# Patient Record
Sex: FEMALE | Race: WHITE | Employment: FULL TIME | URBAN - METROPOLITAN AREA
[De-identification: names, ages, dates, MRNs, and addresses within clinical notes are randomized per-mention and may not be internally consistent; named-entity substitution may affect disease eponyms.]

---

## 2017-01-02 ENCOUNTER — TELEPHONE (OUTPATIENT)
Dept: FAMILY MEDICINE | Facility: CLINIC | Age: 27
End: 2017-01-02

## 2017-01-11 ENCOUNTER — OFFICE VISIT (OUTPATIENT)
Dept: URGENT CARE | Facility: URGENT CARE | Age: 27
End: 2017-01-11
Payer: COMMERCIAL

## 2017-01-11 VITALS — HEART RATE: 118 BPM | RESPIRATION RATE: 16 BRPM | TEMPERATURE: 100 F | OXYGEN SATURATION: 97 %

## 2017-01-11 DIAGNOSIS — R06.02 SOB (SHORTNESS OF BREATH): ICD-10-CM

## 2017-01-11 DIAGNOSIS — R50.9 FEVER, UNSPECIFIED: ICD-10-CM

## 2017-01-11 DIAGNOSIS — J10.1 INFLUENZA A: Primary | ICD-10-CM

## 2017-01-11 LAB
FLUAV+FLUBV AG SPEC QL: ABNORMAL
FLUAV+FLUBV AG SPEC QL: ABNORMAL
SPECIMEN SOURCE: ABNORMAL

## 2017-01-11 PROCEDURE — 87804 INFLUENZA ASSAY W/OPTIC: CPT | Performed by: FAMILY MEDICINE

## 2017-01-11 PROCEDURE — 99213 OFFICE O/P EST LOW 20 MIN: CPT | Performed by: FAMILY MEDICINE

## 2017-01-11 RX ORDER — OSELTAMIVIR PHOSPHATE 75 MG/1
75 CAPSULE ORAL 2 TIMES DAILY
Qty: 10 CAPSULE | Refills: 0 | Status: SHIPPED | OUTPATIENT
Start: 2017-01-11 | End: 2017-04-19

## 2017-01-11 RX ORDER — ALBUTEROL SULFATE 90 UG/1
2 AEROSOL, METERED RESPIRATORY (INHALATION) EVERY 6 HOURS PRN
Qty: 1 INHALER | Refills: 0 | Status: SHIPPED | OUTPATIENT
Start: 2017-01-11

## 2017-01-11 NOTE — MR AVS SNAPSHOT
After Visit Summary   1/11/2017    Anny Aragon    MRN: [id number]           Patient Information     Date Of Birth          1990        Visit Information        Provider Department      1/11/2017 8:00 PM Baljeet Spence MD [facility]        Today's Diagnoses     Influenza A    -  1     SOB (shortness of breath)         Fever, unspecified           Care Instructions    Take full course of tamiflu for influenza A.  Use albuterol inhaler every 4-6 hours as needed.      Influenza (Adult)    Influenza is also called the flu. It is a viral illness that affects the air passages of your lungs. It is different from the common cold. The flu can easily be passed from one to person to another. It may be spread through the air by coughing and sneezing. Or it can be spread by touching the sick person and then touching your own eyes, nose, or mouth.  The flu starts 1 to 3 days after you are exposed to the flu virus. It may last for 1 to 2 weeks. You usually don t need to take antibiotics unless you have a complication. This might be an ear or sinus infection or pneumonia.  Symptoms of the flu may be mild or severe. They can include extreme tiredness (wanting to stay in bed all day), chills, fevers, muscle aches, soreness with eye movement, headache, and a dry, hacking cough.  Home care  Follow these guidelines when caring for yourself at home:    Avoid being around cigarette smoke, whether yours or other people s.    Acetaminophen or ibuprofen will help ease your fever, muscle aches, and headache. Don t give aspirin to anyone younger than 18 who has the flu. Aspirin can harm the liver.    Nausea and loss of appetite are common with the flu. Eat light meals. Drink 6 to 8 glasses of liquids every day. Good choices are water, sport drinks, soft drinks without caffeine, juices, tea, and soup. Extra fluids will also help loosen secretions in your nose and lungs.    Over-the-counter cold medicines  will not make the flu go away faster. But the medicines may help with coughing, sore throat, and congestion in your nose and sinuses. Don t use a decongestant if you have high blood pressure.    Stay home until your fever has been gone for at least 24 hours without using medicine to reduce fever.  Follow-up care  Follow up with your health care provider, or as advised, if you are not getting better over the next week.  If you are 65 or older, talk with your provider about getting a pneumococcal vaccine every 5 years. You should also get this vaccine if you have chronic asthma or COPD. All adults should get a flu vaccine every fall. Ask your provider about this.  When to seek medical advice  Call your health care provider right away if any of these occur:    Cough with lots of colored sputum (mucus) or blood in your sputum    Chest pain, shortness of breath, wheezing, or difficulty breathing    Severe headache, or face, neck, or ear pain    New rash with fever    Fever of 101 F (38 C) oral or higher that doesn t get better with fever medicine    Confusion, behavior change, or seizure    Severe weakness or dizziness    You get a fever or cough after getting better for a few days       0972-0999 [employer]. [address] [place] [address]. All rights reserved. This information is not intended as a substitute for professional medical care. Always follow your healthcare professional's instructions.              Follow-ups after your visit        Who to contact     If you have questions or need follow up information about today's clinic visit or your schedule please contact [facility] directly at [phone].  Normal or non-critical lab and imaging results will be communicated to you by MyChart, letter or phone within 4 business days after the clinic has received the results. If you do not hear from us within 7 days, please contact the clinic through MyChart or phone. If you have  "a critical or abnormal lab result, we will notify you by phone as soon as possible.  Submit refill requests through [employer] or call your pharmacy and they will forward the refill request to us. Please allow 3 business days for your refill to be completed.          Additional Information About Your Visit        [employer] Information     [employer] lets you send messages to your doctor, view your test results, renew your prescriptions, schedule appointments and more. To sign up, go to [email or web address] . Click on \"Log in\" on the left side of the screen, which will take you to the Welcome page. Then click on \"Sign up Now\" on the right side of the page.     You will be asked to enter the access code listed below, as well as some personal information. Please follow the directions to create your username and password.     Your access code is: [email or web address]  Expires: 2017  8:35 PM     Your access code will  in 90 days. If you need help or a new code, please call your [facility] clinic or [phone].        Care EveryWhere ID     This is your Care EveryWhere ID. This could be used by other organizations to access your [facility] medical records  [id number]        Your Vitals Were     Pulse Temperature Respirations Pulse Oximetry          118 100  F (37.8  C) (Oral) 16 97%         Blood Pressure from Last 3 Encounters:   08/26/15 136/88   06/24/15 134/82   02/10/15 118/70    Weight from Last 3 Encounters:   08/26/15 290 lb (131.543 kg)   06/24/15 295 lb (133.811 kg)   02/10/15 285 lb (129.275 kg)              We Performed the Following     Influenza A/B antigen          Today's Medication Changes          These changes are accurate as of: 17  8:35 PM.  If you have any questions, ask your nurse or doctor.               Start taking these medicines.        Dose/Directions    oseltamivir 75 MG capsule   Commonly known as:  TAMIFLU   Used for:  Influenza A   Started by:  Baljeet Spence MD        Dose:  75 mg   Take " 1 capsule (75 mg) by mouth 2 times daily   Quantity:  10 capsule   Refills:  0         These medicines have changed or have updated prescriptions.        Dose/Directions    * albuterol 108 (90 BASE) MCG/ACT Inhaler   Commonly known as:  PROAIR HFA/PROVENTIL HFA/VENTOLIN HFA   This may have changed:  Another medication with the same name was added. Make sure you understand how and when to take each.   Used for:  Exercise-induced asthma   Changed by:  Kirsten Bolivar PA-C        Dose:  2 puff   Inhale 2 puffs into the lungs every 6 hours as needed for shortness of breath / dyspnea   Quantity:  1 Inhaler   Refills:  0       * albuterol 108 (90 BASE) MCG/ACT Inhaler   Commonly known as:  PROAIR HFA/PROVENTIL HFA/VENTOLIN HFA   This may have changed:  You were already taking a medication with the same name, and this prescription was added. Make sure you understand how and when to take each.   Used for:  SOB (shortness of breath)   Changed by:  Baljeet Spence MD        Dose:  2 puff   Inhale 2 puffs into the lungs every 6 hours as needed for shortness of breath / dyspnea or wheezing   Quantity:  1 Inhaler   Refills:  0       * Notice:  This list has 2 medication(s) that are the same as other medications prescribed for you. Read the directions carefully, and ask your doctor or other care provider to review them with you.         Where to get your medicines      [facility] [address] [place] [address] [address] [address]    Hours:  24-hours Phone:  [phone]    - albuterol 108 (90 BASE) MCG/ACT Inhaler  - oseltamivir 75 MG capsule             Primary Care Provider Office Phone # Fax #    Kirsten Bolivar PA-C [phone]       [address] [address]        Thank you!     Thank you for choosing [facility]  for your care. Our goal is always to  provide you with excellent care. Hearing back from our patients is one way we can continue to improve our services. Please take a few minutes to complete the written survey that you may receive in the mail after your visit with us. Thank you!             Your Updated Medication List - Protect others around you: Learn how to safely use, store and throw away your medicines at www.disposemymeds.org.          This list is accurate as of: 1/11/17  8:35 PM.  Always use your most recent med list.                   Brand Name Dispense Instructions for use    * albuterol 108 (90 BASE) MCG/ACT Inhaler    PROAIR HFA/PROVENTIL HFA/VENTOLIN HFA    1 Inhaler    Inhale 2 puffs into the lungs every 6 hours as needed for shortness of breath / dyspnea       * albuterol 108 (90 BASE) MCG/ACT Inhaler    PROAIR HFA/PROVENTIL HFA/VENTOLIN HFA    1 Inhaler    Inhale 2 puffs into the lungs every 6 hours as needed for shortness of breath / dyspnea or wheezing       oseltamivir 75 MG capsule    TAMIFLU    10 capsule    Take 1 capsule (75 mg) by mouth 2 times daily       * Notice:  This list has 2 medication(s) that are the same as other medications prescribed for you. Read the directions carefully, and ask your doctor or other care provider to review them with you.

## 2017-01-11 NOTE — Clinical Note
[facility]  [address]  [address]  [facility] [place] [address]  [phone]  Dept: [phone]      1/11/2017    Re: Anny Aragon      TO WHOM IT MAY CONCERN:    Anny Aragon  was seen on 1/11/2017.  Please excuse her from work 1/11-1/12 due to influenza A.      Charbel Spence MD  [facility]

## 2017-01-12 NOTE — NURSING NOTE
"Chief Complaint   Patient presents with     Urgent Care     Shortness of Breath     Fever with SOB X 2 days.     Initial Pulse 118  Temp(Src) 100  F (37.8  C) (Oral)  Resp 16  SpO2 97% Estimated body mass index is 49.75 kg/(m^2) as calculated from the following:    Height as of 8/26/15: 5' 4\" (1.626 m).    Weight as of 8/26/15: 290 lb (131.543 kg).  BP completed using cuff size  NA (Not Taken)    Ada Miller/GOPAL    "

## 2017-01-12 NOTE — PATIENT INSTRUCTIONS
Take full course of tamiflu for influenza A.  Use albuterol inhaler every 4-6 hours as needed.      Influenza (Adult)    Influenza is also called the flu. It is a viral illness that affects the air passages of your lungs. It is different from the common cold. The flu can easily be passed from one to person to another. It may be spread through the air by coughing and sneezing. Or it can be spread by touching the sick person and then touching your own eyes, nose, or mouth.  The flu starts 1 to 3 days after you are exposed to the flu virus. It may last for 1 to 2 weeks. You usually don t need to take antibiotics unless you have a complication. This might be an ear or sinus infection or pneumonia.  Symptoms of the flu may be mild or severe. They can include extreme tiredness (wanting to stay in bed all day), chills, fevers, muscle aches, soreness with eye movement, headache, and a dry, hacking cough.  Home care  Follow these guidelines when caring for yourself at home:    Avoid being around cigarette smoke, whether yours or other people s.    Acetaminophen or ibuprofen will help ease your fever, muscle aches, and headache. Don t give aspirin to anyone younger than 18 who has the flu. Aspirin can harm the liver.    Nausea and loss of appetite are common with the flu. Eat light meals. Drink 6 to 8 glasses of liquids every day. Good choices are water, sport drinks, soft drinks without caffeine, juices, tea, and soup. Extra fluids will also help loosen secretions in your nose and lungs.    Over-the-counter cold medicines will not make the flu go away faster. But the medicines may help with coughing, sore throat, and congestion in your nose and sinuses. Don t use a decongestant if you have high blood pressure.    Stay home until your fever has been gone for at least 24 hours without using medicine to reduce fever.  Follow-up care  Follow up with your health care provider, or as advised, if you are not getting better over the  next week.  If you are 65 or older, talk with your provider about getting a pneumococcal vaccine every 5 years. You should also get this vaccine if you have chronic asthma or COPD. All adults should get a flu vaccine every fall. Ask your provider about this.  When to seek medical advice  Call your health care provider right away if any of these occur:    Cough with lots of colored sputum (mucus) or blood in your sputum    Chest pain, shortness of breath, wheezing, or difficulty breathing    Severe headache, or face, neck, or ear pain    New rash with fever    Fever of 101 F (38 C) oral or higher that doesn t get better with fever medicine    Confusion, behavior change, or seizure    Severe weakness or dizziness    You get a fever or cough after getting better for a few days       9457-0955 [employer]. [address] [place] [address], [place] [address]. All rights reserved. This information is not intended as a substitute for professional medical care. Always follow your healthcare professional's instructions.

## 2017-01-12 NOTE — PROGRESS NOTES
SUBJECTIVE:   Anny Aragon is a 26 year old female presenting with a chief complaint of SOB.  Initially had chills and fever, developed SOB today.  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  Generalized bodyaches  Onset of symptoms was 1 day(s) ago.  Course of illness is worsening.    Severity moderate  Current and Associated symptoms: cough and SOB  Treatment measures tried include Fluids, OTC meds - mucinex, Vicks and Rest.  Predisposing factors include : exercise induce asthma.    Past Medical History   Diagnosis Date     PCOS (polycystic ovarian syndrome)      Obesity      Current Outpatient Prescriptions   Medication Sig Dispense Refill     albuterol (PROAIR HFA, PROVENTIL HFA, VENTOLIN HFA) 108 (90 BASE) MCG/ACT inhaler Inhale 2 puffs into the lungs every 6 hours as needed for shortness of breath / dyspnea 1 Inhaler 0     Social History   Substance Use Topics     Smoking status: Never Smoker      Smokeless tobacco: Never Used     Alcohol Use: No       ROS:  CONSTITUTIONAL:POSITIVE  for fatigue, fever, malaise and myalgias  INTEGUMENTARY/SKIN: NEGATIVE for worrisome rashes, moles or lesions  ENT/MOUTH: NEGATIVE for ear, mouth and throat problems and POSITIVE for rhinorrhea-clear  RESP:POSITIVE for SOB/dyspnea  CV: NEGATIVE for chest pain, palpitations or peripheral edema  GI: NEGATIVE for nausea, abdominal pain, heartburn, or change in bowel habits    OBJECTIVE  :Pulse 118  Temp(Src) 100  F (37.8  C) (Oral)  Resp 16  SpO2 97%  GENERAL APPEARANCE: healthy, alert and no distress  EYES: EOMI,  PERRL, conjunctiva clear  HENT: ear canals and TM's normal.  Nose and mouth without ulcers, erythema or lesions  NECK: supple, nontender, no lymphadenopathy  RESP: lungs clear to auscultation - no rales, rhonchi or wheezes  CV: regular rates and rhythm, normal S1 S2, no murmur noted  NEURO: Normal strength and tone, sensory exam grossly normal,  normal speech and mentation  SKIN: no suspicious lesions or  rashes    Results for orders placed or performed in visit on 01/11/17   Influenza A/B antigen   Result Value Ref Range    Influenza A/B Agn Specimen Nasal     Influenza A (A) NEG     Positive   Test results must be correlated with clinical data. If necessary, results   should be confirmed by a molecular assay or viral culture.      Influenza B  NEG     Negative   Test results must be correlated with clinical data. If necessary, results   should be confirmed by a molecular assay or viral culture.         ASSESSMENT/PLAN:  (J10.1) Influenza A  (primary encounter diagnosis)  Plan: oseltamivir (TAMIFLU) 75 MG capsule            (R06.02) SOB (shortness of breath)  Comment: influenza A  Plan: Influenza A/B antigen, albuterol (PROAIR         HFA/PROVENTIL HFA/VENTOLIN HFA) 108 (90 BASE)         MCG/ACT Inhaler            (R50.9) Fever, unspecified  Comment: influenza A  Plan: Influenza A/B antigen            Reassurance given, reviewed symptomatic treatment, plenty of fluids and rest.  RX for Tamiflu, appropriate as symptoms within 48 hours window.  RX for albuterol inhaler given to help with SOB.    Work excuse note given to be off work.  Return to clinic if no resolution of symptoms.    Baljeet Spence MD  January 11, 2017 8:40 PM

## 2017-01-19 NOTE — TELEPHONE ENCOUNTER
Call Regarding Preventive Health Screening Cervical/PAP    Attempt 2    Message     Comments: Per patient will call back when ready      Outreach   Caroline Alejandra

## 2017-04-19 ENCOUNTER — OFFICE VISIT (OUTPATIENT)
Dept: URGENT CARE | Facility: URGENT CARE | Age: 27
End: 2017-04-19
Payer: COMMERCIAL

## 2017-04-19 VITALS
BODY MASS INDEX: 51.32 KG/M2 | TEMPERATURE: 98.6 F | OXYGEN SATURATION: 97 % | SYSTOLIC BLOOD PRESSURE: 164 MMHG | DIASTOLIC BLOOD PRESSURE: 94 MMHG | WEIGHT: 293 LBS | HEART RATE: 108 BPM

## 2017-04-19 DIAGNOSIS — R10.9 FLANK PAIN: Primary | ICD-10-CM

## 2017-04-19 LAB
ALBUMIN UR-MCNC: NEGATIVE MG/DL
APPEARANCE UR: CLEAR
BACTERIA #/AREA URNS HPF: ABNORMAL /HPF
BILIRUB UR QL STRIP: NEGATIVE
COLOR UR AUTO: YELLOW
GLUCOSE UR STRIP-MCNC: NEGATIVE MG/DL
HGB UR QL STRIP: ABNORMAL
KETONES UR STRIP-MCNC: NEGATIVE MG/DL
LEUKOCYTE ESTERASE UR QL STRIP: NEGATIVE
NITRATE UR QL: NEGATIVE
NON-SQ EPI CELLS #/AREA URNS LPF: ABNORMAL /LPF
PH UR STRIP: 5 PH (ref 5–7)
RBC #/AREA URNS AUTO: ABNORMAL /HPF (ref 0–2)
SP GR UR STRIP: 1.02 (ref 1–1.03)
URN SPEC COLLECT METH UR: ABNORMAL
UROBILINOGEN UR STRIP-ACNC: 0.2 EU/DL (ref 0.2–1)
WBC #/AREA URNS AUTO: ABNORMAL /HPF (ref 0–2)

## 2017-04-19 PROCEDURE — 81001 URINALYSIS AUTO W/SCOPE: CPT | Performed by: FAMILY MEDICINE

## 2017-04-19 PROCEDURE — 99212 OFFICE O/P EST SF 10 MIN: CPT | Performed by: INTERNAL MEDICINE

## 2017-04-19 NOTE — NURSING NOTE
"Chief Complaint   Patient presents with     Urgent Care     Flank Pain     Right flank pain X 4 days.  No urinary symptoms.     Initial BP (!) 164/94  Pulse 108  Temp 98.6  F (37  C) (Tympanic)  Wt 299 lb (135.6 kg)  SpO2 97%  BMI 51.32 kg/m2 Estimated body mass index is 51.32 kg/(m^2) as calculated from the following:    Height as of 8/26/15: 5' 4\" (1.626 m).    Weight as of this encounter: 299 lb (135.6 kg).  BP completed using cuff size  large    Ada Miller/GOPAL    "

## 2017-04-22 NOTE — PROGRESS NOTES
[facility] Progress Note        Jossie Mcneal MD, MPH  4-        History:      A pleasant 26 year old year old female is seen for right flank pain x 4 days. She feels her pain is exacerbated today. No radiation of the pain to groin is referred. No nausea or vomiting is referred. No hematuria or dysuria or pyuria is referred. No fever or chills. Patient;s states that her mesnes is irregular. No vaginal discharge. No cough or dyspnea is referred. No melena or hematochezia. No headache or neck pain.         Assessment and Plan:        Right Flank pain    - *UA reflex to Microscopic and Culture ([place] and [facility] (except [place] and [place])  - Urine Microscopic: 0-2 RBC  and 0-2 WBC  Long discussion w the patient regarding further evaluation and going to ER for undertaking imaging of abdomen to rule out etiologies such as nephrogenic causes as well as GI and pelvic pathology. The patient declined despite explanation of the risks involved.  Tylenol 650 mg po q 6 hours alternating w Ibuprofen 400 mg po q 6 hours prn.  Elevated BP: no chest pain or visual symptoms. No headache. Advised to be reevaluated by PCP in 1 week to recheck BP.  F/U w PCP ASAP as the patient declines further evaluation currently in th e ER.                     Physical Exam:      BP (!) 164/94  Pulse 108  Temp 98.6  F (37  C) (Tympanic)  Wt 299 lb (135.6 kg)  SpO2 97%  BMI 51.32 kg/m2     Constitutional: Patient is in no distress The patient is pleasant and cooperative.   HEENT: Head:  Head is atraumatic, normocephalic.    Eyes: Pupils are equal, round and reactive to light and accomodation.  Sclera is non-icteric. No conjunctival injection, or exudate noted. Extraocular motion is intact. Visual acuity is intact bilaterally.  Ears:  External acoustic canals are patent and clear.  There is no erythema and bulging( exudate)  of the ( R/L ) tympanic membrane(s ).   Nose:  No Nasal congestion w/o drainage or  mucosal ulceration is noted.  Throat:  Oral mucosa is moist.  No oral lesions are noted.  No posterior pharyngeal hyperemia or exudate noted.     Neck Supple.  There is no cervical lymphadenopathy.  No nuchal rigidity noted.  There is no meningismus.     Cardiovascular: Heart is regular to rate and rhythm.  No murmur is noted.     Chest. Chest Symmetrical, no soft tissues, swelling, or tenderness upon palpation   Lungs: Clear in the anterior and posterior pulmonary fields.   Abdomen: Soft and non-tender. Positive bowel sounds.   Back Right flank tenderness is noted. No point tenderness of the spine is noted.   Extremeties No edema, no calf tenderness.   Neuro: No focal deficit.   Skin No petechiae or purpura is noted.  There is no rash.   Mood Normal              Medications:        PRN Meds:          Data:      All new lab and imaging data was reviewed.   Results for orders placed or performed in visit on 04/19/17   *UA reflex to Microscopic and Culture ([place] and [facility] (except [place] and [place])   Result Value Ref Range    Color Urine Yellow     Appearance Urine Clear     Glucose Urine Negative NEG mg/dL    Bilirubin Urine Negative NEG    Ketones Urine Negative NEG mg/dL    Specific Gravity Urine 1.020 1.003 - 1.035    Blood Urine Moderate (A) NEG    pH Urine 5.0 5.0 - 7.0 pH    Protein Albumin Urine Negative NEG mg/dL    Urobilinogen Urine 0.2 0.2 - 1.0 EU/dL    Nitrite Urine Negative NEG    Leukocyte Esterase Urine Negative NEG    Source Midstream Urine    Urine Microscopic   Result Value Ref Range    WBC Urine O - 2 0 - 2 /HPF    RBC Urine O - 2 0 - 2 /HPF    Squamous Epithelial /LPF Urine Few FEW /LPF    Bacteria Urine Few (A) NEG /HPF

## 2017-09-03 ENCOUNTER — HEALTH MAINTENANCE LETTER (OUTPATIENT)
Age: 27
End: 2017-09-03

## 2017-12-08 ENCOUNTER — OFFICE VISIT (OUTPATIENT)
Dept: FAMILY MEDICINE | Facility: CLINIC | Age: 27
End: 2017-12-08
Payer: COMMERCIAL

## 2017-12-08 VITALS
WEIGHT: 291 LBS | OXYGEN SATURATION: 96 % | TEMPERATURE: 98.6 F | DIASTOLIC BLOOD PRESSURE: 130 MMHG | SYSTOLIC BLOOD PRESSURE: 170 MMHG | HEART RATE: 97 BPM | HEIGHT: 64 IN | BODY MASS INDEX: 49.68 KG/M2

## 2017-12-08 DIAGNOSIS — I10 BENIGN ESSENTIAL HYPERTENSION: ICD-10-CM

## 2017-12-08 DIAGNOSIS — Z01.84 IMMUNITY STATUS TESTING: ICD-10-CM

## 2017-12-08 DIAGNOSIS — E28.2 PCOS (POLYCYSTIC OVARIAN SYNDROME): ICD-10-CM

## 2017-12-08 DIAGNOSIS — Z23 ENCOUNTER FOR IMMUNIZATION: ICD-10-CM

## 2017-12-08 DIAGNOSIS — Z00.00 ROUTINE GENERAL MEDICAL EXAMINATION AT A HEALTH CARE FACILITY: Primary | ICD-10-CM

## 2017-12-08 DIAGNOSIS — E66.01 MORBID OBESITY (H): ICD-10-CM

## 2017-12-08 LAB
BETA HCG QUAL IFA URINE: NEGATIVE
ERYTHROCYTE [DISTWIDTH] IN BLOOD BY AUTOMATED COUNT: 14.3 % (ref 10–15)
HBA1C MFR BLD: 5.4 % (ref 4.3–6)
HCT VFR BLD AUTO: 45.5 % (ref 35–47)
HGB BLD-MCNC: 15.5 G/DL (ref 11.7–15.7)
MCH RBC QN AUTO: 27 PG (ref 26.5–33)
MCHC RBC AUTO-ENTMCNC: 34.1 G/DL (ref 31.5–36.5)
MCV RBC AUTO: 79 FL (ref 78–100)
PLATELET # BLD AUTO: ABNORMAL 10E9/L (ref 150–450)
RBC # BLD AUTO: 5.74 10E12/L (ref 3.8–5.2)
WBC # BLD AUTO: 11.1 10E9/L (ref 4–11)

## 2017-12-08 PROCEDURE — 99395 PREV VISIT EST AGE 18-39: CPT | Performed by: PHYSICIAN ASSISTANT

## 2017-12-08 PROCEDURE — 84703 CHORIONIC GONADOTROPIN ASSAY: CPT | Performed by: PHYSICIAN ASSISTANT

## 2017-12-08 PROCEDURE — 83036 HEMOGLOBIN GLYCOSYLATED A1C: CPT | Performed by: PHYSICIAN ASSISTANT

## 2017-12-08 PROCEDURE — 90746 HEPB VACCINE 3 DOSE ADULT IM: CPT | Performed by: PHYSICIAN ASSISTANT

## 2017-12-08 PROCEDURE — 84443 ASSAY THYROID STIM HORMONE: CPT | Performed by: PHYSICIAN ASSISTANT

## 2017-12-08 PROCEDURE — 80061 LIPID PANEL: CPT | Performed by: PHYSICIAN ASSISTANT

## 2017-12-08 PROCEDURE — 36415 COLL VENOUS BLD VENIPUNCTURE: CPT | Performed by: PHYSICIAN ASSISTANT

## 2017-12-08 PROCEDURE — 87340 HEPATITIS B SURFACE AG IA: CPT | Performed by: PHYSICIAN ASSISTANT

## 2017-12-08 PROCEDURE — 80053 COMPREHEN METABOLIC PANEL: CPT | Performed by: PHYSICIAN ASSISTANT

## 2017-12-08 PROCEDURE — 85027 COMPLETE CBC AUTOMATED: CPT | Performed by: PHYSICIAN ASSISTANT

## 2017-12-08 RX ORDER — METFORMIN HCL 500 MG
500 TABLET, EXTENDED RELEASE 24 HR ORAL
Qty: 90 TABLET | Refills: 1 | Status: SHIPPED | OUTPATIENT
Start: 2017-12-08 | End: 2018-11-16

## 2017-12-08 RX ORDER — LEVONORGESTREL/ETHIN.ESTRADIOL 0.1-0.02MG
1 TABLET ORAL DAILY
Qty: 84 TABLET | Refills: 3 | Status: SHIPPED | OUTPATIENT
Start: 2017-12-08 | End: 2018-11-16

## 2017-12-08 RX ORDER — LISINOPRIL AND HYDROCHLOROTHIAZIDE 12.5; 2 MG/1; MG/1
1 TABLET ORAL DAILY
Qty: 30 TABLET | Refills: 1 | Status: SHIPPED | OUTPATIENT
Start: 2017-12-08 | End: 2018-03-27

## 2017-12-08 ASSESSMENT — ANXIETY QUESTIONNAIRES
GAD7 TOTAL SCORE: 0
7. FEELING AFRAID AS IF SOMETHING AWFUL MIGHT HAPPEN: NOT AT ALL
1. FEELING NERVOUS, ANXIOUS, OR ON EDGE: NOT AT ALL
3. WORRYING TOO MUCH ABOUT DIFFERENT THINGS: NOT AT ALL
6. BECOMING EASILY ANNOYED OR IRRITABLE: NOT AT ALL
5. BEING SO RESTLESS THAT IT IS HARD TO SIT STILL: NOT AT ALL
2. NOT BEING ABLE TO STOP OR CONTROL WORRYING: NOT AT ALL

## 2017-12-08 ASSESSMENT — PATIENT HEALTH QUESTIONNAIRE - PHQ9
SUM OF ALL RESPONSES TO PHQ QUESTIONS 1-9: 0
5. POOR APPETITE OR OVEREATING: NOT AT ALL

## 2017-12-08 NOTE — MR AVS SNAPSHOT
After Visit Summary   12/8/2017    Anny Aragon    MRN: [id number]           Patient Information     Date Of Birth          1990        Visit Information        Provider Department      12/8/2017 9:30 AM Kirsten Bolivar PA-C [facility]        Today's Diagnoses     Screening for malignant neoplasm of cervix        Need for prophylactic vaccination and inoculation against influenza          Care Instructions      Preventive Health Recommendations  Female Ages 26 - 39  Yearly exam:   See your health care provider every year in order to    Review health changes.     Discuss preventive care.      Review your medicines if you your doctor has prescribed any.    Until age 30: Get a Pap test every three years (more often if you have had an abnormal result).    After age 30: Talk to your doctor about whether you should have a Pap test every 3 years or have a Pap test with HPV screening every 5 years.   You do not need a Pap test if your uterus was removed (hysterectomy) and you have not had cancer.  You should be tested each year for STDs (sexually transmitted diseases), if you're at risk.   Talk to your provider about how often to have your cholesterol checked.  If you are at risk for diabetes, you should have a diabetes test (fasting glucose).  Shots: Get a flu shot each year. Get a tetanus shot every 10 years.   Nutrition:     Eat at least 5 servings of fruits and vegetables each day.    Eat whole-grain bread, whole-wheat pasta and brown rice instead of white grains and rice.    Talk to your provider about Calcium and Vitamin D.     Lifestyle    Exercise at least 150 minutes a week (30 minutes a day, 5 days of the week). This will help you control your weight and prevent disease.    Limit alcohol to one drink per day.    No smoking.     Wear sunscreen to prevent skin cancer.    See your dentist every six months for an exam and cleaning.            Follow-ups after your visit    "     Your next 10 appointments already scheduled     Dec 08, 2017  9:30 AM CST   PHYSICAL with Kirsten Bolivar PA-C   [facility]    [address] [place] [id number] [phone]              Who to contact     If you have questions or need follow up information about today's clinic visit or your schedule please contact [facility] directly at [phone].  Normal or non-critical lab and imaging results will be communicated to you by Upplicationhart, letter or phone within 4 business days after the clinic has received the results. If you do not hear from us within 7 days, please contact the clinic through Upplicationhart or phone. If you have a critical or abnormal lab result, we will notify you by phone as soon as possible.  Submit refill requests through [employer] or call your pharmacy and they will forward the refill request to us. Please allow 3 business days for your refill to be completed.          Additional Information About Your Visit        [employer] Information     [employer] lets you send messages to your doctor, view your test results, renew your prescriptions, schedule appointments and more. To sign up, go to [email or web address] . Click on \"Log in\" on the left side of the screen, which will take you to the Welcome page. Then click on \"Sign up Now\" on the right side of the page.     You will be asked to enter the access code listed below, as well as some personal information. Please follow the directions to create your username and password.     Your access code is: [email or web address]  Expires: 3/8/2018  9:26 AM     Your access code will  in 90 days. If you need help or a new code, please call your [facility] clinic or [phone].        Care EveryWhere ID     This is your Care EveryWhere ID. This could be used by other organizations to access your [facility] medical records  [id number]        Your Vitals Were     Pulse " "Temperature Height Pulse Oximetry Breastfeeding? BMI (Body Mass Index)    97 98.6  F (37  C) (Oral) 5' 4.25\" (1.632 m) 96% No 49.56 kg/m2       Blood Pressure from Last 3 Encounters:   12/08/17 (!) 170/130   04/19/17 (!) 164/94   08/26/15 136/88    Weight from Last 3 Encounters:   12/08/17 291 lb (132 kg)   04/19/17 299 lb (135.6 kg)   08/26/15 290 lb (131.5 kg)              Today, you had the following     No orders found for display       Primary Care Provider Office Phone # Fax #    Kirsten Bolivar PA-C [phone] [address] [facility] [address]        Equal Access to Services     GIACOMO LANGFORD : Sol Pryor, waaxda luqadaha, qaybta kaalmada adejimmyyaharpal, niharika bro . So [facility] [phone].    ATENCIÓN: Si habla español, tiene a rey disposición servicios gratuitos de asistencia lingüística. Ricardo al [phone].    We comply with applicable federal civil rights laws and [place] laws. We do not discriminate on the basis of race, color, national origin, age, disability, sex, sexual orientation, or gender identity.            Thank you!     Thank you for choosing [facility]  for your care. Our goal is always to provide you with excellent care. Hearing back from our patients is one way we can continue to improve our services. Please take a few minutes to complete the written survey that you may receive in the mail after your visit with us. Thank you!             Your Updated Medication List - Protect others around you: Learn how to safely use, store and throw away your medicines at www.disposemymeds.org.          This list is accurate as of: 12/8/17  9:26 AM.  Always use your most recent med list.                   Brand Name Dispense Instructions for use Diagnosis    albuterol 108 (90 BASE) MCG/ACT Inhaler    PROAIR HFA/PROVENTIL HFA/VENTOLIN HFA    1 Inhaler    Inhale 2 puffs into the lungs every 6 hours as needed for " shortness of breath / dyspnea or wheezing    SOB (shortness of breath)

## 2017-12-08 NOTE — PROGRESS NOTES
"    SUBJECTIVE:                                                    Anny Aragon is a 27 year old female who presents to clinic today for the following health issues:  {Provider please address medication reconciliation discrepancies--rooming staff please delete if no med/rec issues}    {Superlists:001506}    {additional problems for provider to add:517384}    Problem list and histories reviewed & adjusted, as indicated.  Additional history: {NONE - AS DOCUMENTED:150718::\"as documented\"}    {HIST REVIEW/ LINKS 2:736063}    {PROVIDER CHARTING PREFERENCE:122786}      "

## 2017-12-08 NOTE — PROGRESS NOTES
SUBJECTIVE:   CC: Anny Aragon is an 27 year old woman who presents for preventive health visit.     Physical   Annual:     Getting at least 3 servings of Calcium per day::  NO    Bi-annual eye exam::  Yes    Dental care twice a year::  Yes    Sleep apnea or symptoms of sleep apnea::  None    Diet::  Regular (no restrictions)    Frequency of exercise::  None    Taking medications regularly::  Yes    Medication side effects::  None    Additional concerns today::  YES      Would like to restart OCP and metformin for PCOS. 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     Forms for school    Today's PHQ-2 Score:   PHQ-2 ( 1999 Pf[employer]) 12/8/2017   Q1: Little interest or pleasure in doing things 0   Q2: Feeling down, depressed or hopeless 0   PHQ-2 Score 0   Q1: Little interest or pleasure in doing things Not at all   Q2: Feeling down, depressed or hopeless Not at all   PHQ-2 Score 0       Abuse: Current or Past(Physical, Sexual or Emotional)- No  Do you feel safe in your environment - Yes    Social History   Substance Use Topics     Smoking status: Never Smoker     Smokeless tobacco: Never Used     Alcohol use No     The patient does not drink >3 drinks per day nor >7 drinks per week.    Reviewed orders with patient.  Reviewed health maintenance and updated orders accordingly - Yes  Labs reviewed in EPIC        Pertinent mammograms are reviewed under the imaging tab.  History of abnormal Pap smear: No declines pap     Reviewed and updated as needed this visit by clinical staff  Tobacco  Allergies  Meds  Med Hx  Surg Hx  Fam Hx  Soc Hx        Reviewed and updated as needed this visit by Provider        Past Medical History:   Diagnosis Date     Obesity      PCOS (polycystic ovarian syndrome)         Review of Systems  C: NEGATIVE for fever, chills, change in weight  I: NEGATIVE for worrisome rashes, moles or lesions  E: NEGATIVE for vision changes or irritation  ENT: NEGATIVE for ear, mouth and throat  problems  R: NEGATIVE for significant cough or SOB  B: NEGATIVE for masses, tenderness or discharge  CV: NEGATIVE for chest pain, palpitations or peripheral edema  GI: NEGATIVE for nausea, abdominal pain, heartburn, or change in bowel habits  : NEGATIVE for unusual urinary or vaginal symptoms. Periods are regular.  M: NEGATIVE for significant arthralgias or myalgia  N: NEGATIVE for weakness, dizziness or paresthesias  ENDOCRINE: h/o PCOS  P: NEGATIVE for changes in mood or affect     OBJECTIVE:   Physical Exam  GENERAL: healthy, alert and no distress  EYES: Eyes grossly normal to inspection, PERRL and conjunctivae and sclerae normal  HENT: ear canals and TM's normal, nose and mouth without ulcers or lesions  NECK: no adenopathy, no asymmetry, masses, or scars and thyroid normal to palpation  RESP: lungs clear to auscultation - no rales, rhonchi or wheezes  CV: regular rate and rhythm, normal S1 S2, no S3 or S4, no murmur, click or rub, no peripheral edema and peripheral pulses strong  ABDOMEN: soft, nontender, no hepatosplenomegaly, no masses and bowel sounds normal  MS: no gross musculoskeletal defects noted, no edema  SKIN: no suspicious lesions or rashes  NEURO: Normal strength and tone, mentation intact and speech normal  PSYCH: mentation appears normal, affect normal/bright    ASSESSMENT/PLAN:   1. Routine general medical examination at a health care facility    - CBC with platelets  - Comprehensive metabolic panel  - Lipid panel reflex to direct LDL Fasting    2. Immunity status testing    - Hepatitis B surface antigen    3. PCOS (polycystic ovarian syndrome)  Restart metformin slowly, recheck in 2 weeks.   - TSH with free T4 reflex  - Hemoglobin A1c  - metFORMIN (GLUCOPHAGE-XR) 500 MG 24 hr tablet; Take 1 tablet (500 mg) by mouth daily (with dinner)  Dispense: 90 tablet; Refill: 1  - levonorgestrel-ethinyl estradiol (AVIANE,ALESSE,LESSINA) 0.1-20 MG-MCG per tablet; Take 1 tablet by mouth daily  Dispense: 84  "tablet; Refill: 3  - Beta HCG qual IFA urine    4. Benign essential hypertension  Newly dx. Recheck 3-5 days  Sooner if symptoms worsen  - lisinopril-hydrochlorothiazide (PRINZIDE/ZESTORETIC) 20-12.5 MG per tablet; Take 1 tablet by mouth daily  Dispense: 30 tablet; Refill: 1    5. Encounter for immunization    - HEPATITIS B VACCINE, ADULT, IM    6. Morbid obesity (H)        COUNSELING:  Reviewed preventive health counseling, as reflected in patient instructions       Regular exercise       Healthy diet/nutrition       Immunizations    Vaccinated for: Hepatitis B               reports that she has never smoked. She has never used smokeless tobacco.    Estimated body mass index is 51.32 kg/(m^2) as calculated from the following:    Height as of 8/26/15: 5' 4\" (1.626 m).    Weight as of 4/19/17: 299 lb (135.6 kg).   Weight management plan: Discussed healthy diet and exercise guidelines and patient will follow up in 1 month in clinic to re-evaluate. Specific weight management program called 1discussed and follow up in 1 month in clinic to re-evaluate.    Counseling Resources:  ATP IV Guidelines  Pooled Cohorts Equation Calculator  Breast Cancer Risk Calculator  FRAX Risk Assessment  ICSI Preventive Guidelines  Dietary Guidelines for Americans, 2010  USDA's MyPlate  ASA Prophylaxis  Lung CA Screening    Kirsten Bolivar PA-C  [facility]  Answers for HPI/ROS submitted by the patient on 12/8/2017   PHQ-2 Score: 0    "

## 2017-12-08 NOTE — LETTER
My Asthma Action Plan  Name: Anny Aragon   YOB: 1990  Date: 12/8/2017   My doctor: Kirsten Bolivar PA-C   My clinic: [facility]        My Control Medicine:   My Rescue Medicine:    My Asthma Severity:   Avoid your asthma triggers:              GREEN ZONE   Good Control    I feel good    No cough or wheeze    Can work, sleep and play without asthma symptoms       Take your asthma control medicine every day.     1. If exercise triggers your asthma, take your rescue medication    15 minutes before exercise or sports, and    During exercise if you have asthma symptoms  2. Spacer to use with inhaler: If you have a spacer, make sure to use it with your inhaler             YELLOW ZONE Getting Worse  I have ANY of these:    I do not feel good    Cough or wheeze    Chest feels tight    Wake up at night   1. Keep taking your Green Zone medications  2. Start taking your rescue medicine:    every 20 minutes for up to 1 hour. Then every 4 hours for 24-48 hours.  3. If you stay in the Yellow Zone for more than 12-24 hours, contact your doctor.  4. If you do not return to the Green Zone in 12-24 hours or you get worse, start taking your oral steroid medicine if prescribed by your provider.           RED ZONE Medical Alert - Get Help  I have ANY of these:    I feel awful    Medicine is not helping    Breathing getting harder    Trouble walking or talking    Nose opens wide to breathe       1. Take your rescue medicine NOW  2. If your provider has prescribed an oral steroid medicine, start taking it NOW  3. Call your doctor NOW  4. If you are still in the Red Zone after 20 minutes and you have not reached your doctor:    Take your rescue medicine again and    Call 911 or go to the emergency room right away    See your regular doctor within 2 weeks of an Emergency Room or Urgent Care visit for follow-up treatment.        Electronically signed by: Kirsten Bolivar, December 8, 2017    Annual  Reminders:  Meet with Asthma Educator,  Flu Shot in the Fall, consider Pneumonia Vaccination for patients with asthma (aged 19 and older).    Pharmacy:    [facility] [place], [place] - [address] AT [facility] & [address] [place] [address] DRUG STORE [address] [place], [place] - [address] AT [address] [place] [address]                    Asthma Triggers  How To Control Things That Make Your Asthma Worse    Triggers are things that make your asthma worse.  Look at the list below to help you find your triggers and what you can do about them.  You can help prevent asthma flare-ups by staying away from your triggers.      Trigger                                                          What you can do   Cigarette Smoke  Tobacco smoke can make asthma worse. Do not allow smoking in your home, car or around you.  Be sure no one smokes at a child s day care or school.  If you smoke, ask your health care provider for ways to help you quit.  Ask family members to quit too.  Ask your health care provider for a referral to Quit Plan to help you quit smoking, or call 6-029-018PLAN.     Colds, Flu, Bronchitis  These are common triggers of asthma. Wash your hands often.  Don t touch your eyes, nose or mouth.  Get a flu shot every year.     Dust Mites  These are tiny bugs that live in cloth or carpet. They are too small to see. Wash sheets and blankets in hot water every week.   Encase pillows and mattress in dust mite proof covers.  Avoid having carpet if you can. If you have carpet, vacuum weekly.   Use a dust mask and HEPA vacuum.   Pollen and Outdoor Mold  Some people are allergic to trees, grass, or weed pollen, or molds. Try to keep your windows closed.  Limit time out doors when pollen count is high.   Ask you health care provider about taking medicine during allergy season.     Animal Dander  Some people are allergic to skin flakes, urine or saliva from pets with fur or feathers. Keep pets with fur or feathers out of  your home.    If you can t keep the pet outdoors, then keep the pet out of your bedroom.  Keep the bedroom door closed.  Keep pets off cloth furniture and away from stuffed toys.     Mice, Rats, and Cockroaches  Some people are allergic to the waste from these pests.   Cover food and garbage.  Clean up spills and food crumbs.  Store grease in the refrigerator.   Keep food out of the bedroom.   Indoor Mold  This can be a trigger if your home has high moisture. Fix leaking faucets, pipes, or other sources of water.   Clean moldy surfaces.  Dehumidify basement if it is damp and smelly.   Smoke, Strong Odors, and Sprays  These can reduce air quality. Stay away from strong odors and sprays, such as perfume, powder, hair spray, paints, smoke incense, paint, cleaning products, candles and new carpet.   Exercise or Sports  Some people with asthma have this trigger. Be active!  Ask your doctor about taking medicine before sports or exercise to prevent symptoms.    Warm up for 5-10 minutes before and after sports or exercise.     Other Triggers of Asthma  Cold air:  Cover your nose and mouth with a scarf.  Sometimes laughing or crying can be a trigger.  Some medicines and food can trigger asthma.

## 2017-12-09 LAB
ALBUMIN SERPL-MCNC: 3.2 G/DL (ref 3.4–5)
ALP SERPL-CCNC: 78 U/L (ref 40–150)
ALT SERPL W P-5'-P-CCNC: 41 U/L (ref 0–50)
ANION GAP SERPL CALCULATED.3IONS-SCNC: 10 MMOL/L (ref 3–14)
AST SERPL W P-5'-P-CCNC: 21 U/L (ref 0–45)
BILIRUB SERPL-MCNC: 0.4 MG/DL (ref 0.2–1.3)
BUN SERPL-MCNC: 9 MG/DL (ref 7–30)
CALCIUM SERPL-MCNC: 8.9 MG/DL (ref 8.5–10.1)
CHLORIDE SERPL-SCNC: 104 MMOL/L (ref 94–109)
CHOLEST SERPL-MCNC: 162 MG/DL
CO2 SERPL-SCNC: 26 MMOL/L (ref 20–32)
CREAT SERPL-MCNC: 0.72 MG/DL (ref 0.52–1.04)
GFR SERPL CREATININE-BSD FRML MDRD: >90 ML/MIN/1.7M2
GLUCOSE SERPL-MCNC: 83 MG/DL (ref 70–99)
HDLC SERPL-MCNC: 48 MG/DL
LDLC SERPL CALC-MCNC: 98 MG/DL
NONHDLC SERPL-MCNC: 114 MG/DL
POTASSIUM SERPL-SCNC: 4 MMOL/L (ref 3.4–5.3)
PROT SERPL-MCNC: 7.7 G/DL (ref 6.8–8.8)
SODIUM SERPL-SCNC: 140 MMOL/L (ref 133–144)
TRIGL SERPL-MCNC: 79 MG/DL
TSH SERPL DL<=0.005 MIU/L-ACNC: 1.09 MU/L (ref 0.4–4)

## 2017-12-09 ASSESSMENT — ANXIETY QUESTIONNAIRES: GAD7 TOTAL SCORE: 0

## 2017-12-11 LAB — HBV SURFACE AG SERPL QL IA: NONREACTIVE

## 2017-12-21 ENCOUNTER — OFFICE VISIT (OUTPATIENT)
Dept: FAMILY MEDICINE | Facility: CLINIC | Age: 27
End: 2017-12-21
Payer: COMMERCIAL

## 2017-12-21 VITALS
DIASTOLIC BLOOD PRESSURE: 98 MMHG | HEIGHT: 64 IN | WEIGHT: 291 LBS | BODY MASS INDEX: 49.68 KG/M2 | OXYGEN SATURATION: 95 % | HEART RATE: 86 BPM | TEMPERATURE: 98.1 F | SYSTOLIC BLOOD PRESSURE: 155 MMHG

## 2017-12-21 DIAGNOSIS — I10 BENIGN ESSENTIAL HYPERTENSION: Primary | ICD-10-CM

## 2017-12-21 PROCEDURE — 99213 OFFICE O/P EST LOW 20 MIN: CPT | Performed by: PHYSICIAN ASSISTANT

## 2017-12-21 RX ORDER — LISINOPRIL AND HYDROCHLOROTHIAZIDE 12.5; 2 MG/1; MG/1
2 TABLET ORAL DAILY
Qty: 60 TABLET | Refills: 1 | Status: SHIPPED | OUTPATIENT
Start: 2017-12-21 | End: 2018-01-03

## 2017-12-21 ASSESSMENT — PATIENT HEALTH QUESTIONNAIRE - PHQ9
10. IF YOU CHECKED OFF ANY PROBLEMS, HOW DIFFICULT HAVE THESE PROBLEMS MADE IT FOR YOU TO DO YOUR WORK, TAKE CARE OF THINGS AT HOME, OR GET ALONG WITH OTHER PEOPLE: NOT DIFFICULT AT ALL
SUM OF ALL RESPONSES TO PHQ QUESTIONS 1-9: 0
SUM OF ALL RESPONSES TO PHQ QUESTIONS 1-9: 0

## 2017-12-21 NOTE — PROGRESS NOTES
"  SUBJECTIVE:                                                    Anny Aragon is a 27 year old female who presents to clinic today for the following health issues:      History of Present Illness     Hypertension:     Outpatient blood pressures:  Are being checked    Blood pressures checked at:  Work    Dietary sodium intake::  No added salt diet    Diet:  Regular (no restrictions)  Frequency of exercise:  None  Taking medications regularly:  Yes  Medication side effects:  None  Additional concerns today:  No      BP's running 140-150/90s. Patient states headaches are improved.     Problem list and histories reviewed & adjusted, as indicated.  Additional history: as documented        Patient Active Problem List   Diagnosis     CARDIOVASCULAR SCREENING; LDL GOAL LESS THAN 160     Obesity     Exercise-induced asthma     PCOS (polycystic ovarian syndrome)     Vitamin D deficiency disease     Emotional lability     Morbid obesity (H)     Benign essential hypertension     Past Surgical History:   Procedure Laterality Date     NO HISTORY OF SURGERY         Social History   Substance Use Topics     Smoking status: Never Smoker     Smokeless tobacco: Never Used     Alcohol use No     Family History   Problem Relation Age of Onset     Unknown/Adopted Mother      Unknown/Adopted Father              ROS:  Constitutional, HEENT, cardiovascular, pulmonary, gi and gu systems are negative, except as otherwise noted.      OBJECTIVE:   BP (!) 155/98 (BP Location: Right arm, Patient Position: Chair, Cuff Size: Adult Large)  Pulse 86  Temp 98.1  F (36.7  C) (Oral)  Ht 5' 4.25\" (1.632 m)  Wt 291 lb (132 kg)  SpO2 95%  BMI 49.56 kg/m2  Body mass index is 49.56 kg/(m^2).  GENERAL APPEARANCE: healthy, alert and no distress  RESP: lungs clear to auscultation - no rales, rhonchi or wheezes  CV: regular rates and rhythm, normal S1 S2, no S3 or S4 and no murmur, click or rub  MS: extremities normal- no gross deformities " noted        ASSESSMENT/PLAN:             1. Benign essential hypertension  Recheck 2-3 weeks, uncontrolled, will increase dose of medication  Will check potassium at next OV.   - lisinopril-hydrochlorothiazide (PRINZIDE/ZESTORETIC) 20-12.5 MG per tablet; Take 2 tablets by mouth daily  Dispense: 60 tablet; Refill: 1        Kirsten Bolivar PA-C  [facility]  Answers for HPI/ROS submitted by the patient on 12/21/2017   PHQ-2 Score: 0  If you checked off any problems, how difficult have these problems made it for you to do your work, take care of things at home, or get along with other people?: Not difficult at all  PHQ9 TOTAL SCORE: 0

## 2017-12-21 NOTE — MR AVS SNAPSHOT
"              After Visit Summary   12/21/2017    Anny Aragon    MRN: [id number]           Patient Information     Date Of Birth          1990        Visit Information        Provider Department      12/21/2017 5:00 PM Kirsten Bolivar PA-C [facility]        Today's Diagnoses     Screening for malignant neoplasm of cervix        Need for prophylactic vaccination and inoculation against influenza           Follow-ups after your visit        Your next 10 appointments already scheduled     Jan 12, 2018  1:15 PM CST   SHORT with Kirsten Bolivar PA-C   [facility] ([facility])    [address] [place] [id number] [phone]              Who to contact     If you have questions or need follow up information about today's clinic visit or your schedule please contact [facility] directly at [phone].  Normal or non-critical lab and imaging results will be communicated to you by MyChart, letter or phone within 4 business days after the clinic has received the results. If you do not hear from us within 7 days, please contact the clinic through MyChart or phone. If you have a critical or abnormal lab result, we will notify you by phone as soon as possible.  Submit refill requests through Hunie or call your pharmacy and they will forward the refill request to us. Please allow 3 business days for your refill to be completed.          Additional Information About Your Visit        MyChart Information     Hunie lets you send messages to your doctor, view your test results, renew your prescriptions, schedule appointments and more. To sign up, go to [email or web address] . Click on \"Log in\" on the left side of the screen, which will take you to the Welcome page. Then click on \"Sign up Now\" on the right side of the page.     You will be asked to enter the access code listed below, as well as some personal " "information. Please follow the directions to create your username and password.     Your access code is: [email or web address]  Expires: 3/8/2018  9:26 AM     Your access code will  in 90 days. If you need help or a new code, please call your [place] clinic or [phone].        Care EveryWhere ID     This is your Care EveryWhere ID. This could be used by other organizations to access your [place] medical records  [id number]        Your Vitals Were     Pulse Temperature Height Pulse Oximetry BMI (Body Mass Index)       86 98.1  F (36.7  C) (Oral) 5' 4.25\" (1.632 m) 95% 49.56 kg/m2        Blood Pressure from Last 3 Encounters:   17 (!) 155/98   17 (!) 170/130   17 (!) 164/94    Weight from Last 3 Encounters:   17 291 lb (132 kg)   17 291 lb (132 kg)   17 299 lb (135.6 kg)              Today, you had the following     No orders found for display       Primary Care Provider Office Phone # Fax #    Kirsten Bolivar PA-C [phone] [address] [facility] [address]        Equal Access to Services     GIACOMO LANGFORD AH: Hadii catalina george hadasho Soomaali, waaxda luqadaha, qaybta kaalmada adeegyada, waxay idiin haysamy bro . So [facility] [phone].    ATENCIÓN: Si habla español, tiene a rey disposición servicios gratuitos de asistencia lingüística. Llame al [phone].    We comply with applicable federal civil rights laws and [place] laws. We do not discriminate on the basis of race, color, national origin, age, disability, sex, sexual orientation, or gender identity.            Thank you!     Thank you for choosing [facility]  for your care. Our goal is always to provide you with excellent care. Hearing back from our patients is one way we can continue to improve our services. Please take a few minutes to complete the written survey that you may receive in the mail after your visit with us. Thank you!             Your Updated " Medication List - Protect others around you: Learn how to safely use, store and throw away your medicines at www.disposemymeds.org.          This list is accurate as of: 12/21/17  5:19 PM.  Always use your most recent med list.                   Brand Name Dispense Instructions for use Diagnosis    albuterol 108 (90 BASE) MCG/ACT Inhaler    PROAIR HFA/PROVENTIL HFA/VENTOLIN HFA    1 Inhaler    Inhale 2 puffs into the lungs every 6 hours as needed for shortness of breath / dyspnea or wheezing    SOB (shortness of breath)       levonorgestrel-ethinyl estradiol 0.1-20 MG-MCG per tablet    AVIANE,ALESSE,LESSINA    84 tablet    Take 1 tablet by mouth daily    PCOS (polycystic ovarian syndrome)       lisinopril-hydrochlorothiazide 20-12.5 MG per tablet    PRINZIDE/ZESTORETIC    30 tablet    Take 1 tablet by mouth daily    Benign essential hypertension       metFORMIN 500 MG 24 hr tablet    GLUCOPHAGE-XR    90 tablet    Take 1 tablet (500 mg) by mouth daily (with dinner)    PCOS (polycystic ovarian syndrome)

## 2017-12-22 ASSESSMENT — PATIENT HEALTH QUESTIONNAIRE - PHQ9: SUM OF ALL RESPONSES TO PHQ QUESTIONS 1-9: 0

## 2018-01-03 ENCOUNTER — OFFICE VISIT (OUTPATIENT)
Dept: FAMILY MEDICINE | Facility: CLINIC | Age: 28
End: 2018-01-03
Payer: COMMERCIAL

## 2018-01-03 VITALS
TEMPERATURE: 98.3 F | DIASTOLIC BLOOD PRESSURE: 90 MMHG | WEIGHT: 291.6 LBS | OXYGEN SATURATION: 97 % | HEART RATE: 89 BPM | HEIGHT: 64 IN | BODY MASS INDEX: 49.78 KG/M2 | SYSTOLIC BLOOD PRESSURE: 149 MMHG

## 2018-01-03 DIAGNOSIS — I10 BENIGN ESSENTIAL HYPERTENSION: Primary | ICD-10-CM

## 2018-01-03 DIAGNOSIS — Z12.4 SCREENING FOR MALIGNANT NEOPLASM OF CERVIX: ICD-10-CM

## 2018-01-03 PROCEDURE — G0145 SCR C/V CYTO,THINLAYER,RESCR: HCPCS | Performed by: PHYSICIAN ASSISTANT

## 2018-01-03 PROCEDURE — 99213 OFFICE O/P EST LOW 20 MIN: CPT | Performed by: PHYSICIAN ASSISTANT

## 2018-01-03 RX ORDER — AMLODIPINE BESYLATE 5 MG/1
5 TABLET ORAL DAILY
Qty: 30 TABLET | Refills: 1 | Status: SHIPPED | OUTPATIENT
Start: 2018-01-03 | End: 2018-03-15

## 2018-01-03 NOTE — MR AVS SNAPSHOT
"              After Visit Summary   1/3/2018    Anny Aragon    MRN: [id number]           Patient Information     Date Of Birth          1990        Visit Information        Provider Department      1/3/2018 9:30 AM Kirsten Bolivar PA-C [facility]         Follow-ups after your visit        Who to contact     If you have questions or need follow up information about today's clinic visit or your schedule please contact [facility] directly at [phone].  Normal or non-critical lab and imaging results will be communicated to you by MyChart, letter or phone within 4 business days after the clinic has received the results. If you do not hear from us within 7 days, please contact the clinic through Sendmailhart or phone. If you have a critical or abnormal lab result, we will notify you by phone as soon as possible.  Submit refill requests through Plug Apps or call your pharmacy and they will forward the refill request to us. Please allow 3 business days for your refill to be completed.          Additional Information About Your Visit        [facility] Information     Plug Apps lets you send messages to your doctor, view your test results, renew your prescriptions, schedule appointments and more. To sign up, go to [email or web address] . Click on \"Log in\" on the left side of the screen, which will take you to the Welcome page. Then click on \"Sign up Now\" on the right side of the page.     You will be asked to enter the access code listed below, as well as some personal information. Please follow the directions to create your username and password.     Your access code is: [email or web address]  Expires: 3/8/2018  9:26 AM     Your access code will  in 90 days. If you need help or a new code, please call your [facility] or [phone].        Care EveryWhere ID     This is your Care EveryWhere ID. This could be used by other organizations to access your [place] medical " "records  [id number]        Your Vitals Were     Pulse Temperature Height Pulse Oximetry Breastfeeding? BMI (Body Mass Index)    89 98.3  F (36.8  C) (Oral) 5' 4\" (1.626 m) 97% No 50.05 kg/m2       Blood Pressure from Last 3 Encounters:   01/03/18 149/90   12/21/17 (!) 155/98   12/08/17 (!) 170/130    Weight from Last 3 Encounters:   01/03/18 291 lb 9.6 oz (132.3 kg)   12/21/17 291 lb (132 kg)   12/08/17 291 lb (132 kg)              Today, you had the following     No orders found for display         Today's Medication Changes          These changes are accurate as of: 1/3/18  9:30 AM.  If you have any questions, ask your nurse or doctor.               These medicines have changed or have updated prescriptions.        Dose/Directions    lisinopril-hydrochlorothiazide 20-12.5 MG per tablet   Commonly known as:  PRINZIDE/ZESTORETIC   This may have changed:  Another medication with the same name was removed. Continue taking this medication, and follow the directions you see here.   Used for:  Benign essential hypertension   Changed by:  Kirsten Bolivar PA-C        Dose:  1 tablet   Take 1 tablet by mouth daily   Quantity:  30 tablet   Refills:  1                Primary Care Provider Office Phone # Fax #    Kirsten Bolivar PA-C [phone] [address] [facility] [address]        Equal Access to Services     [facility] AH: Hadii catalina george hadasho Soireneali, waaxda luqadaha, qaybta kaalmada adeegyada, niharika corona. So [facility] [phone].    ATENCIÓN: Si habla español, tiene a rey disposición servicios gratuitos de asistencia lingüística. Llame al [phone].    We comply with applicable federal civil rights laws and [place] laws. We do not discriminate on the basis of race, color, national origin, age, disability, sex, sexual orientation, or gender identity.            Thank you!     Thank you for choosing [facility]  for your care. Our goal is " always to provide you with excellent care. Hearing back from our patients is one way we can continue to improve our services. Please take a few minutes to complete the written survey that you may receive in the mail after your visit with us. Thank you!             Your Updated Medication List - Protect others around you: Learn how to safely use, store and throw away your medicines at www.disposemymeds.org.          This list is accurate as of: 1/3/18  9:30 AM.  Always use your most recent med list.                   Brand Name Dispense Instructions for use Diagnosis    albuterol 108 (90 BASE) MCG/ACT Inhaler    PROAIR HFA/PROVENTIL HFA/VENTOLIN HFA    1 Inhaler    Inhale 2 puffs into the lungs every 6 hours as needed for shortness of breath / dyspnea or wheezing    SOB (shortness of breath)       levonorgestrel-ethinyl estradiol 0.1-20 MG-MCG per tablet    AVIANE,ALESSE,LESSINA    84 tablet    Take 1 tablet by mouth daily    PCOS (polycystic ovarian syndrome)       lisinopril-hydrochlorothiazide 20-12.5 MG per tablet    PRINZIDE/ZESTORETIC    30 tablet    Take 1 tablet by mouth daily    Benign essential hypertension       metFORMIN 500 MG 24 hr tablet    GLUCOPHAGE-XR    90 tablet    Take 1 tablet (500 mg) by mouth daily (with dinner)    PCOS (polycystic ovarian syndrome)

## 2018-01-03 NOTE — LETTER
January 10, 2018      Anny DUNCAN Margo  [address]  [facility] [address]    Dear ,      I am happy to inform you that your recent cervical cancer screening test (PAP smear) was normal.      Preventative screenings such as this help to ensure your health for years to come. You should repeat a pap smear in 3 years, unless otherwise directed.      You will still need to return to the clinic every year for your annual exam and other preventive tests.     Please contact the clinic at [phone] if you have further questions.       Sincerely,      Kirsten Bolivar PA-C/meagan

## 2018-01-03 NOTE — PROGRESS NOTES
"  SUBJECTIVE:   Anny Aragon is a 27 year old female who presents to clinic today for the following health issues:      Hypertension Follow-up      Outpatient blood pressures are not being checked.    Low Salt Diet: not monitoring salt        Amount of exercise or physical activity: None    Problems taking medications regularly: No    Medication side effects: none    Diet: regular (no restrictions)    Patient states she is here for a pap as well. Patient states she did not do it at her last appointment.           Problem list and histories reviewed & adjusted, as indicated.  Additional history: as documented    Patient Active Problem List   Diagnosis     CARDIOVASCULAR SCREENING; LDL GOAL LESS THAN 160     Obesity     Exercise-induced asthma     PCOS (polycystic ovarian syndrome)     Vitamin D deficiency disease     Emotional lability     Morbid obesity (H)     Benign essential hypertension     Past Surgical History:   Procedure Laterality Date     NO HISTORY OF SURGERY         Social History   Substance Use Topics     Smoking status: Never Smoker     Smokeless tobacco: Never Used     Alcohol use No     Family History   Problem Relation Age of Onset     Unknown/Adopted Mother      Unknown/Adopted Father              Reviewed and updated as needed this visit by clinical staffTobacco  Allergies  Meds  Med Hx  Surg Hx  Fam Hx  Soc Hx      Reviewed and updated as needed this visit by Provider         ROS:  Constitutional, HEENT, cardiovascular, pulmonary, gi and gu systems are negative, except as otherwise noted.      OBJECTIVE:   /90 (BP Location: Right arm, Patient Position: Chair, Cuff Size: Adult Large)  Pulse 89  Temp 98.3  F (36.8  C) (Oral)  Ht 5' 4\" (1.626 m)  Wt 291 lb 9.6 oz (132.3 kg)  SpO2 97%  Breastfeeding? No  BMI 50.05 kg/m2  Body mass index is 50.05 kg/(m^2).  GENERAL APPEARANCE: healthy, alert and no distress  RESP: lungs clear to auscultation - no rales, rhonchi or wheezes  CV: " Patient Information     Patient Name MRN Tika Bhatt [id number] Female 1935      Telephone Encounter by Hortencia Foreman at 2017  1:59 PM     Author:  Hortencia Foreman Service:  (none) Author Type:  (none)     Filed:  2017  2:05 PM Encounter Date:  2017 Status:  Signed     :  Hortencia Foreman            Patient's son notified of doctor's reply and he would like a referral placed for her to get some exercise. He will discuss this with the nurses at [facility] when he sees them today.  He appreciates the care we provide and wanted to let us know that.  Hortencia Foreman CMA(AAMA)  ..................2017   2:01 PM          regular rates and rhythm, normal S1 S2, no S3 or S4 and no murmur, click or rub   (female): normal cervix, adnexae, and uterus without masses or discharge        ASSESSMENT/PLAN:             1. Benign essential hypertension  Uncontrolled.  Continue lisinopril-hctz.  Recheck 1-2 weeks.   - amLODIPine (NORVASC) 5 MG tablet; Take 1 tablet (5 mg) by mouth daily  Dispense: 30 tablet; Refill: 1    2. Screening for malignant neoplasm of cervix    - Pap imaged thin layer screen reflex to HPV if ASCUS - recommend age 25 - 29        Kirsten Bolivar PA-C  [facility]

## 2018-01-05 LAB
COPATH REPORT: NORMAL
PAP: NORMAL

## 2018-03-15 DIAGNOSIS — I10 BENIGN ESSENTIAL HYPERTENSION: ICD-10-CM

## 2018-03-15 NOTE — TELEPHONE ENCOUNTER
"Requested Prescriptions   Pending Prescriptions Disp Refills     amLODIPine (NORVASC) 5 MG tablet [Pharmacy Med Name: AMLODIPINE BESYLATE 5MG TABLETS]    Last Written Prescription Date:  1/3/18  Last Fill Quantity: 30 tablets,  # refills: 1   Last office visit: 1/3/2018 with prescribing provider:  Osmel   Future Office Visit:   30 tablet 0     Sig: TAKE 1 TABLET(5 MG) BY MOUTH DAILY    Calcium Channel Blockers Protocol  Failed    3/15/2018  4:04 PM       Failed - Blood pressure under 140/90 in past 12 months    BP Readings from Last 3 Encounters:   01/03/18 149/90   12/21/17 (!) 155/98   12/08/17 (!) 170/130                Passed - Recent (12 mo) or future (30 days) visit within the authorizing provider's specialty    Patient had office visit in the last 12 months or has a visit in the next 30 days with authorizing provider or within the authorizing provider's specialty.  See \"Patient Info\" tab in inbasket, or \"Choose Columns\" in Meds & Orders section of the refill encounter.           Passed - Patient is age 18 or older       Passed - No active pregnancy on record       Passed - Normal serum creatinine on file in past 12 months    Recent Labs   Lab Test  12/08/17   0957   CR  0.72            Passed - No positive pregnancy test in past 12 months        "

## 2018-03-16 RX ORDER — AMLODIPINE BESYLATE 5 MG/1
TABLET ORAL
Qty: 30 TABLET | Refills: 0 | Status: SHIPPED | OUTPATIENT
Start: 2018-03-16 | End: 2018-03-27

## 2018-03-16 NOTE — TELEPHONE ENCOUNTER
1/3/18 visit note. F/u 1-2 weeks for bp recheck.    We called Anny, scheduled OV 3/22/18   Prescription approved per [facility] Refill Protocol.  Joseph Siddiqui RN

## 2018-03-27 ENCOUNTER — TELEPHONE (OUTPATIENT)
Dept: FAMILY MEDICINE | Facility: CLINIC | Age: 28
End: 2018-03-27

## 2018-03-27 ENCOUNTER — OFFICE VISIT (OUTPATIENT)
Dept: FAMILY MEDICINE | Facility: CLINIC | Age: 28
End: 2018-03-27
Payer: COMMERCIAL

## 2018-03-27 VITALS
HEART RATE: 68 BPM | DIASTOLIC BLOOD PRESSURE: 84 MMHG | WEIGHT: 293 LBS | TEMPERATURE: 97.5 F | RESPIRATION RATE: 14 BRPM | BODY MASS INDEX: 51.67 KG/M2 | SYSTOLIC BLOOD PRESSURE: 122 MMHG

## 2018-03-27 DIAGNOSIS — E66.01 MORBID OBESITY (H): Primary | ICD-10-CM

## 2018-03-27 DIAGNOSIS — I10 BENIGN ESSENTIAL HYPERTENSION: ICD-10-CM

## 2018-03-27 LAB
ANION GAP SERPL CALCULATED.3IONS-SCNC: 8 MMOL/L (ref 3–14)
BUN SERPL-MCNC: 12 MG/DL (ref 7–30)
CALCIUM SERPL-MCNC: 9.6 MG/DL (ref 8.5–10.1)
CHLORIDE SERPL-SCNC: 101 MMOL/L (ref 94–109)
CO2 SERPL-SCNC: 26 MMOL/L (ref 20–32)
CREAT SERPL-MCNC: 0.72 MG/DL (ref 0.52–1.04)
GFR SERPL CREATININE-BSD FRML MDRD: >90 ML/MIN/1.7M2
GLUCOSE SERPL-MCNC: 89 MG/DL (ref 70–99)
POTASSIUM SERPL-SCNC: 3.9 MMOL/L (ref 3.4–5.3)
SODIUM SERPL-SCNC: 135 MMOL/L (ref 133–144)

## 2018-03-27 PROCEDURE — 99214 OFFICE O/P EST MOD 30 MIN: CPT | Performed by: PHYSICIAN ASSISTANT

## 2018-03-27 PROCEDURE — 80048 BASIC METABOLIC PNL TOTAL CA: CPT | Performed by: PHYSICIAN ASSISTANT

## 2018-03-27 PROCEDURE — 36415 COLL VENOUS BLD VENIPUNCTURE: CPT | Performed by: PHYSICIAN ASSISTANT

## 2018-03-27 RX ORDER — AMLODIPINE BESYLATE 5 MG/1
TABLET ORAL
Qty: 90 TABLET | Refills: 1 | Status: SHIPPED | OUTPATIENT
Start: 2018-03-27 | End: 2018-11-09

## 2018-03-27 RX ORDER — TOPIRAMATE 25 MG/1
TABLET, FILM COATED ORAL
Qty: 70 TABLET | Refills: 0 | Status: SHIPPED | OUTPATIENT
Start: 2018-03-27 | End: 2018-04-17

## 2018-03-27 RX ORDER — LISINOPRIL AND HYDROCHLOROTHIAZIDE 12.5; 2 MG/1; MG/1
1 TABLET ORAL DAILY
Qty: 90 TABLET | Refills: 1 | Status: SHIPPED | OUTPATIENT
Start: 2018-03-27 | End: 2018-11-16

## 2018-03-27 NOTE — MR AVS SNAPSHOT
"              After Visit Summary   3/27/2018    Anny Aragon    MRN: [id number]           Patient Information     Date Of Birth          1990        Visit Information        Provider Department      3/27/2018 11:30 AM Kirsten Bolivar PA-C [facility]         Follow-ups after your visit        Your next 10 appointments already scheduled     Mar 27, 2018 11:30 AM CDT   SHORT with Kirsten Bolivar PA-C   [facility]    [address] [place] [id number] [phone]              Who to contact     If you have questions or need follow up information about today's clinic visit or your schedule please contact [facility] directly at [phone].  Normal or non-critical lab and imaging results will be communicated to you by MyChart, letter or phone within 4 business days after the clinic has received the results. If you do not hear from us within 7 days, please contact the clinic through MyChart or phone. If you have a critical or abnormal lab result, we will notify you by phone as soon as possible.  Submit refill requests through [employer] or call your pharmacy and they will forward the refill request to us. Please allow 3 business days for your refill to be completed.          Additional Information About Your Visit        MyChart Information     [employer] lets you send messages to your doctor, view your test results, renew your prescriptions, schedule appointments and more. To sign up, go to [email or web address] . Click on \"Log in\" on the left side of the screen, which will take you to the Welcome page. Then click on \"Sign up Now\" on the right side of the page.     You will be asked to enter the access code listed below, as well as some personal information. Please follow the directions to create your username and password.     Your access code is: [email or web address]  Expires: 6/25/2018 11:26 AM     Your " access code will  in 90 days. If you need help or a new code, please call your [place] clinic or [phone].        Care EveryWhere ID     This is your Care EveryWhere ID. This could be used by other organizations to access your [place] medical records  [id number]        Your Vitals Were     Pulse Temperature Respirations BMI (Body Mass Index)          68 97.5  F (36.4  C) (Oral) 14 51.67 kg/m2         Blood Pressure from Last 3 Encounters:   18 122/84   18 149/90   17 (!) 155/98    Weight from Last 3 Encounters:   18 301 lb (136.5 kg)   18 291 lb 9.6 oz (132.3 kg)   17 291 lb (132 kg)              Today, you had the following     No orders found for display       Primary Care Provider Office Phone # Fax #    Kirsten Bolivar PA-C [phone]       [address] [facility] [address]        Equal Access to Services     [facility]: Hadii aad ku hadasho Soomaali, waaxda luqadaha, qaybta kaalmada adeegyada, waxay idiin haysamy bro . So [facility] [phone].    ATENCIÓN: Si habla español, tiene a rey disposición servicios gratuitos de asistencia lingüística. Llame al [phone].    We comply with applicable federal civil rights laws and [place] laws. We do not discriminate on the basis of race, color, national origin, age, disability, sex, sexual orientation, or gender identity.            Thank you!     Thank you for choosing [facility]  for your care. Our goal is always to provide you with excellent care. Hearing back from our patients is one way we can continue to improve our services. Please take a few minutes to complete the written survey that you may receive in the mail after your visit with us. Thank you!             Your Updated Medication List - Protect others around you: Learn how to safely use, store and throw away your medicines at www.disposemymeds.org.          This list is accurate as of 3/27/18 11:26  AM.  Always use your most recent med list.                   Brand Name Dispense Instructions for use Diagnosis    albuterol 108 (90 BASE) MCG/ACT Inhaler    PROAIR HFA/PROVENTIL HFA/VENTOLIN HFA    1 Inhaler    Inhale 2 puffs into the lungs every 6 hours as needed for shortness of breath / dyspnea or wheezing    SOB (shortness of breath)       amLODIPine 5 MG tablet    NORVASC    30 tablet    TAKE 1 TABLET(5 MG) BY MOUTH DAILY    Benign essential hypertension       levonorgestrel-ethinyl estradiol 0.1-20 MG-MCG per tablet    AVIANE,ALESSE,LESSINA    84 tablet    Take 1 tablet by mouth daily    PCOS (polycystic ovarian syndrome)       lisinopril-hydrochlorothiazide 20-12.5 MG per tablet    PRINZIDE/ZESTORETIC    30 tablet    Take 1 tablet by mouth daily    Benign essential hypertension       metFORMIN 500 MG 24 hr tablet    GLUCOPHAGE-XR    90 tablet    Take 1 tablet (500 mg) by mouth daily (with dinner)    PCOS (polycystic ovarian syndrome)

## 2018-03-27 NOTE — TELEPHONE ENCOUNTER
Panel Management Review      Patient has the following on her problem list: None      Composite cancer screening  Chart review shows that this patient is due/due soon for the following None  Summary:    Patient is due/failing the following:   BP CHECK    Action needed:   Patient needs nurse only appointment.    Type of outreach:    patient had office visit today and pass her BP.     Questions for provider review:    None                                                                                                                                    Griselda Stevens      Chart routed to Care Team .

## 2018-03-27 NOTE — LETTER
March 28, 2018      Anny Gomez  [address]  [facility] [place] [facility] [address]          Anny,     Your electrolytes, blood sugar, kidney function and liver enzymes were normal.     Resulted Orders   Basic metabolic panel  (Ca, Cl, CO2, Creat, Gluc, K, Na, BUN)   Result Value Ref Range    Sodium 135 133 - 144 mmol/L    Potassium 3.9 3.4 - 5.3 mmol/L    Chloride 101 94 - 109 mmol/L    Carbon Dioxide 26 20 - 32 mmol/L    Anion Gap 8 3 - 14 mmol/L    Glucose 89 70 - 99 mg/dL    Urea Nitrogen 12 7 - 30 mg/dL    Creatinine 0.72 0.52 - 1.04 mg/dL    GFR Estimate >90 >60 mL/min/1.7m2      Comment:      Non  GFR Calc    GFR Estimate If Black >90 >60 mL/min/1.7m2      Comment:       GFR Calc    Calcium 9.6 8.5 - 10.1 mg/dL       If you have any questions or concerns, please call the clinic at the number listed above.       Sincerely,        Kirsten Bolivar PA-C

## 2018-03-27 NOTE — PROGRESS NOTES
SUBJECTIVE:   Anny Aragon is a 27 year old female who presents to clinic today for the following health issues:      Hypertension     Hypertension:     Outpatient blood pressures:  Are not being checked    Dietary sodium intake::  No added salt diet    Diet:  Regular (no restrictions)  Taking medications regularly:  Yes  Medication side effects:  None  Additional concerns today:  No  History of Present Illness     Hypertension:     Outpatient blood pressures:  Are not being checked    Dietary sodium intake::  No added salt diet    Diet:  Regular (no restrictions)  Taking medications regularly:  Yes  Medication side effects:  None  Additional concerns today:  No    Patient would like referral to Nutritionist.  Feels like she has increased cravings in the evenings.  Metformin seems to help during the daytime, but wondering about something for later on.    Problem list and histories reviewed & adjusted, as indicated.  Additional history: as documented        Patient Active Problem List   Diagnosis     CARDIOVASCULAR SCREENING; LDL GOAL LESS THAN 160     Obesity     Exercise-induced asthma     PCOS (polycystic ovarian syndrome)     Vitamin D deficiency disease     Emotional lability     Morbid obesity (H)     Benign essential hypertension     Past Surgical History:   Procedure Laterality Date     NO HISTORY OF SURGERY         Social History   Substance Use Topics     Smoking status: Never Smoker     Smokeless tobacco: Never Used     Alcohol use No     Family History   Problem Relation Age of Onset     Unknown/Adopted Mother      Unknown/Adopted Father            ROS:  Constitutional, HEENT, cardiovascular, pulmonary, gi and gu systems are negative, except as otherwise noted.    OBJECTIVE:     /84 (BP Location: Right arm, Patient Position: Chair, Cuff Size: Adult Large)  Pulse 68  Temp 97.5  F (36.4  C) (Oral)  Resp 14  Wt 301 lb (136.5 kg)  BMI 51.67 kg/m2  Body mass index is 51.67 kg/(m^2).  GENERAL  APPEARANCE: healthy, alert and no distress  RESP: lungs clear to auscultation - no rales, rhonchi or wheezes  CV: regular rates and rhythm, normal S1 S2, no S3 or S4 and no murmur, click or rub  NEURO: Normal strength and tone, mentation intact and speech normal  PSYCH: mentation appears normal and affect normal/bright        ASSESSMENT/PLAN:             1. Morbid obesity (H)  Referral placed. Trial of topamax.  Rx faxed.   - topiramate (TOPAMAX) 25 MG tablet; Take 1 tablet (25 mg) at bedtime for 1 week, then 1 tablet twice daily for 1 week, then 1 tablet in AM and 2 in PM for 1 week, then 2 tablets twice daily.  Dispense: 70 tablet; Refill: 0  - NUTRITION REFERRAL    2. Benign essential hypertension  Stable. Await labs. Recheck 6 months.   - amLODIPine (NORVASC) 5 MG tablet; TAKE 1 TABLET(5 MG) BY MOUTH DAILY  Dispense: 90 tablet; Refill: 1  - lisinopril-hydrochlorothiazide (PRINZIDE/ZESTORETIC) 20-12.5 MG per tablet; Take 1 tablet by mouth daily  Dispense: 90 tablet; Refill: 1  - Basic metabolic panel  (Ca, Cl, CO2, Creat, Gluc, K, Na, BUN)        Kirsten Bolivar PA-C  [facility]  Answers for HPI/ROS submitted by the patient on 3/27/2018   PHQ-2 Score: 0

## 2018-03-28 ASSESSMENT — ASTHMA QUESTIONNAIRES: ACT_TOTALSCORE: 21

## 2018-04-17 ENCOUNTER — ALLIED HEALTH/NURSE VISIT (OUTPATIENT)
Dept: NURSING | Facility: CLINIC | Age: 28
End: 2018-04-17
Payer: COMMERCIAL

## 2018-04-17 ENCOUNTER — TELEPHONE (OUTPATIENT)
Dept: FAMILY MEDICINE | Facility: CLINIC | Age: 28
End: 2018-04-17

## 2018-04-17 DIAGNOSIS — Z23 NEED FOR HEPATITIS B VACCINATION: Primary | ICD-10-CM

## 2018-04-17 PROCEDURE — 90471 IMMUNIZATION ADMIN: CPT

## 2018-04-17 PROCEDURE — 99207 ZZC NO CHARGE NURSE ONLY: CPT

## 2018-04-17 PROCEDURE — 90746 HEPB VACCINE 3 DOSE ADULT IM: CPT

## 2018-04-17 NOTE — MR AVS SNAPSHOT
"              After Visit Summary   4/17/2018    Anny Aragon    MRN: [id number]           Patient Information     Date Of Birth          1990        Visit Information        Provider Department      4/17/2018 11:00 AM AYAH MEJÍA/LPN [facility]        Today's Diagnoses     Need for hepatitis B vaccination    -  1       Follow-ups after your visit        Your next 10 appointments already scheduled     Apr 18, 2018  1:30 PM CDT   Consult HOD with Xin Snyder, NIR, LD   [facility] ([facility])    [address], [address]  [place] [id number] [phone]              Who to contact     If you have questions or need follow up information about today's clinic visit or your schedule please contact [facility] directly at [phone].  Normal or non-critical lab and imaging results will be communicated to you by MyChart, letter or phone within 4 business days after the clinic has received the results. If you do not hear from us within 7 days, please contact the clinic through MyChart or phone. If you have a critical or abnormal lab result, we will notify you by phone as soon as possible.  Submit refill requests through [employer] or call your pharmacy and they will forward the refill request to us. Please allow 3 business days for your refill to be completed.          Additional Information About Your Visit        MyChart Information     [employer] lets you send messages to your doctor, view your test results, renew your prescriptions, schedule appointments and more. To sign up, go to [email or web address] . Click on \"Log in\" on the left side of the screen, which will take you to the Welcome page. Then click on \"Sign up Now\" on the right side of the page.     You will be asked to enter the access code listed below, as well as some personal information. Please follow the directions to create your username and " password.     Your access code is: [email or web address]  Expires: 2018 11:26 AM     Your access code will  in 90 days. If you need help or a new code, please call your [place] clinic or [phone].        Care EveryWhere ID     This is your Care EveryWhere ID. This could be used by other organizations to access your [place] medical records  [id number]         Blood Pressure from Last 3 Encounters:   18 122/84   18 149/90   17 (!) 155/98    Weight from Last 3 Encounters:   18 136.5 kg (301 lb)   18 132.3 kg (291 lb 9.6 oz)   17 132 kg (291 lb)              We Performed the Following     HEPATITIS B VACCINE, ADULT, IM        Primary Care Provider Office Phone # Fax #    Kirsten Bolivar PA-C [phone] [address] [facility] [address]        Equal Access to Services     [facility]: Hadii aad ku hadasho Soomaali, waaxda luqadaha, qaybta kaalmada adeegyada, waxay kalpesh haysamy bro . So [facility] [phone].    ATENCIÓN: Si habla español, tiene a rey disposición servicios gratuitos de asistencia lingüística. Llame al [phone].    We comply with applicable federal civil rights laws and [place] laws. We do not discriminate on the basis of race, color, national origin, age, disability, sex, sexual orientation, or gender identity.            Thank you!     Thank you for choosing [facility]  for your care. Our goal is always to provide you with excellent care. Hearing back from our patients is one way we can continue to improve our services. Please take a few minutes to complete the written survey that you may receive in the mail after your visit with us. Thank you!             Your Updated Medication List - Protect others around you: Learn how to safely use, store and throw away your medicines at www.disposemymeds.org.          This list is accurate as of 18 11:51 AM.  Always use your most recent med list.                    Brand Name Dispense Instructions for use Diagnosis    albuterol 108 (90 Base) MCG/ACT Inhaler    PROAIR HFA/PROVENTIL HFA/VENTOLIN HFA    1 Inhaler    Inhale 2 puffs into the lungs every 6 hours as needed for shortness of breath / dyspnea or wheezing    SOB (shortness of breath)       amLODIPine 5 MG tablet    NORVASC    90 tablet    TAKE 1 TABLET(5 MG) BY MOUTH DAILY    Benign essential hypertension       levonorgestrel-ethinyl estradiol 0.1-20 MG-MCG per tablet    AVIANE,ALESSE,LESSINA    84 tablet    Take 1 tablet by mouth daily    PCOS (polycystic ovarian syndrome)       lisinopril-hydrochlorothiazide 20-12.5 MG per tablet    PRINZIDE/ZESTORETIC    90 tablet    Take 1 tablet by mouth daily    Benign essential hypertension       metFORMIN 500 MG 24 hr tablet    GLUCOPHAGE-XR    90 tablet    Take 1 tablet (500 mg) by mouth daily (with dinner)    PCOS (polycystic ovarian syndrome)

## 2018-04-17 NOTE — NURSING NOTE
Screening Questionnaire for Adult Immunization    Are you sick today?   No   Do you have allergies to medications, food, a vaccine component or latex?   No   Have you ever had a serious reaction after receiving a vaccination?   No   Do you have a long-term health problem with heart disease, lung disease, asthma, kidney disease, metabolic disease (e.g. diabetes), anemia, or other blood disorder?   No   Do you have cancer, leukemia, HIV/AIDS, or any other immune system problem?   No   In the past 3 months, have you taken medications that affect  your immune system, such as prednisone, other steroids, or anticancer drugs; drugs for the treatment of rheumatoid arthritis, Crohn s disease, or psoriasis; or have you had radiation treatments?   No   Have you had a seizure, or a brain or other nervous system problem?   No   During the past year, have you received a transfusion of blood or blood     products, or been given immune (gamma) globulin or antiviral drug?   No   For women: Are you pregnant or is there a chance you could become        pregnant during the next month?   No   Have you received any vaccinations in the past 4 weeks?   No     Immunization questionnaire answers were all negative.        Per orders of Kirsten Bolivar, injection of 2nd Hep B given by Jyotsna Aguilar. Patient instructed to remain in clinic for 15 minutes afterwards, and to report any adverse reaction to me immediately.       Screening performed by Jyotsna Aguilar on 4/17/2018 at 11:48 AM.

## 2018-04-17 NOTE — TELEPHONE ENCOUNTER
"FYI:  Patient reports that she started taking Topamax on 3/27/18 for weight management.  Noticed she started having memory issues over a 10 day period of time and didn't \"feel like myself\".  Discontinued the Topamax over the weekend.  Does report seeing memory improve since stopping the medication.  Patient is scheduled to see Nutrition on 4/18/18.  Patient is not interested in an alternative to Topamax at this time but instead will try to make life style changes as per the nutritionist recommendations.  Patient wanted to keep you in the loop.  Jyotsna Aguilar M.A.  "

## 2018-04-18 ENCOUNTER — HOSPITAL ENCOUNTER (OUTPATIENT)
Dept: NUTRITION | Facility: CLINIC | Age: 28
Discharge: HOME | End: 2018-04-18
Attending: PHYSICIAN ASSISTANT | Admitting: PHYSICIAN ASSISTANT
Payer: COMMERCIAL

## 2018-04-18 PROCEDURE — 97802 MEDICAL NUTRITION INDIV IN: CPT | Performed by: DIETITIAN, REGISTERED

## 2018-04-18 NOTE — PROGRESS NOTES
"OUTPATIENT NUTRITION ASSESSMENT  REASON FOR ASSESSMENT  Anny Aragon referred by Kirsten Bolivar PA-C  for MNT related to morbid obesity.    Patient accompanied by self      ASSESSMENT   Nutrition History: - Information obtained from patient. Patient has struggled with her weight for years.  Patient states she was diagnosed with PCOS as a teenager and takes Metformin.  Patient was recently taking Topamax but felt cognitively unclear so stopped taking.  Patient enjoys cooking and prepares meals for herself.  Patient struggles with eating at night as feels she needs sugar, then needs salty food and then needs more sugar.  Patient feels like she is out of control with her eating at that time.  Patient works 12 hour shift and feels she makes good choices when she is working.  Patient currently attending nursing school.       Diet Recall:  Breakfast: iced coffee with white chocolate + cake pop from [employer]; hash brown and burrito from [employer]; daniels (4), eggs (2), chocolate chip muffin  Lunch: bratwurst with bun; mashed potatoes, corn bread muffin, morning star veggie burger on bun; string cheese + yogurt  Dinner: [employer] (broccoli cheese soup + BBQ chicken flat bread; salad with sunflower seeds, daniels, sweet onion dressing, 3 strips chicken; 3 slices cheese with crackers   Snack: snack size Snickers; string cheese and M&M's  Beverages: iced coffee  Dining out: varies (will stop for fast food for breakfast; [employer] for lunch or dinner    Exercise: Patient does not have consistent exercise regimen.    PHYSICAL FINDINGS  Observed:  No nutrition-related physical findings observed  Obtained from Chart/Interdisciplinary Team:  None noted    LABS  Labs reviewed    MEDICATIONS  Medications reviewed    ANTHROPOMETRICS   Height: 5'4\"  Weight: 300.3 lbs  BMI (kg/m2): 51.6 kg/m2  Weight Status:  Obesity Grade III BMI >40  %IBW: 250%  Weight History:   Wt Readings from Last 5 Encounters:   03/27/18 136.5 kg (301 lb) "   01/03/18 132.3 kg (291 lb 9.6 oz)   12/21/17 132 kg (291 lb)   12/08/17 132 kg (291 lb)   04/19/17 135.6 kg (299 lb)     ASSESSED NUTRITION NEEDS  Estimated Energy Needs: 6383-5416 kcals/day (11-14 Kcal/Kg)  Justification:  (obese)  Estimated Protein Needs: 75-90 grams protein/day (1-1.2 g pro/Kg)  Justification:  (maintenance)  Estimated Fluid Needs: 0808-5463 mL/day (1 mL/Kcal)    ASSESSED MALNUTRITION STATUS  % Weight Loss:  None noted  % Intake:  No decreased intake noted  Subcutaneous Fat Loss:  None observed  Loss of Muscle Mass:  None observed  Fluid Retention:  None noted    Malnutrition Diagnosis:  Patient does not meet two of the above criteria necessary for diagnosing malnutrition    DIAGNOSIS   Nutrition Diagnosis:  Obese, class III related to excess energy intake as evidenced by current BMI of 51.6 kg/m2      INTERVENTIONS   Nutrition Prescription  Recommend modified carbohydrate diet of <165 grams carbohydrate per day (45 grams at breakfast; 45-60 grams carbohydrate at lunch and dinner)    IMPLEMENTATION   Assessed learning needs and learning preference  Teaching Method(s) used: Booklet / Handout  Explanation  Diet Education:  Provided education on carbohydrate counting, weight loss diet   Nutrition Education (Content):   a)  Discussed label reading, portion sizes, carbohydrate counting and behavior modification.  Encouraged patient to make gradual changes for long term weight loss success.  Discussed mindful eating and importance of being present while eating.  Also suggest patient increase awareness of eating pattern as unable to determine at this time if patient is engaging in binge eating.  Discussed clinical binge and subjective binge eating.  Due to patient's focus on social media, suggest patient take holiday from reading social media.  Patient receptive to ideas.  Supported patient with her struggle with food and weight loss.     b)  Provided Academy of Nutrition and Dietetics Count Your  Carbs: Getting Started   Nutrition Education (Application):   a)  Discussed current eating plans and recommended ways to modify carbohydrate intake.  Also discussed determining specific eating place in home.   b)  Patient verbalizes understanding of diet by stating eat dinner at the table.   Anticipate fair-good compliance   Other: Patient may benefit from working with therapist to assist with food thoughts.    GOALS  <150 grams carbohydrate per day  Eat dinner meal at table    FOLLOW UP/MONITORING   Progress towards goals will be monitored and evaluated per protocol and Practice Guidelines  Patient to follow up in 4 weeks  RD name and number provided     Time Spent with Patient  55 minutes    Siri Montoya, RD, LD  [facility] Outpatient Dietitian  [phone] (office phone)

## 2018-06-09 DIAGNOSIS — I10 BENIGN ESSENTIAL HYPERTENSION: ICD-10-CM

## 2018-06-10 NOTE — TELEPHONE ENCOUNTER
"Requested Prescriptions   Pending Prescriptions Disp Refills     lisinopril-hydrochlorothiazide (PRINZIDE/ZESTORETIC) 20-12.5 MG per tablet [Pharmacy Med Name: LISINOPRIL-HCTZ 20/12.5MG TABLETS]  Last Written Prescription Date:  3/27/2018  Last Fill Quantity: 90 tablet,  # refills: 1   Last office visit: 3/27/2018 with prescribing provider:  Osmel     Future Office Visit:   Next 5 appointments (look out 90 days)     Conner 15, 2018 11:00 AM CDT   Nurse Only with CR MA/LPN   [facility]    [address] [facility] [id number] [phone]                  30 tablet 0     Sig: TAKE 1 TABLET BY MOUTH DAILY    Diuretics (Including Combos) Protocol Passed    6/9/2018 10:10 AM       Passed - Blood pressure under 140/90 in past 12 months    BP Readings from Last 3 Encounters:   03/27/18 122/84   01/03/18 149/90   12/21/17 (!) 155/98                Passed - Recent (12 mo) or future (30 days) visit within the authorizing provider's specialty    Patient had office visit in the last 12 months or has a visit in the next 30 days with authorizing provider or within the authorizing provider's specialty.  See \"Patient Info\" tab in inbasket, or \"Choose Columns\" in Meds & Orders section of the refill encounter.           Passed - Patient is age 18 or older       Passed - No active pregancy on record       Passed - Normal serum creatinine on file in past 12 months    Recent Labs   Lab Test  03/27/18   1140   CR  0.72             Passed - Normal serum potassium on file in past 12 months    Recent Labs   Lab Test  03/27/18   1140   POTASSIUM  3.9                   Passed - Normal serum sodium on file in past 12 months    Recent Labs   Lab Test  03/27/18   1140   NA  135             Passed - No positive pregnancy test in past 12 months          "

## 2018-06-11 RX ORDER — LISINOPRIL AND HYDROCHLOROTHIAZIDE 12.5; 2 MG/1; MG/1
TABLET ORAL
Qty: 30 TABLET | Refills: 0 | OUTPATIENT
Start: 2018-06-11

## 2018-06-11 NOTE — TELEPHONE ENCOUNTER
Duplicate  E-Prescribing Status: Receipt confirmed by pharmacy (3/27/2018 11:38 AM CDT)  Ike Lucia RN, BSN

## 2018-06-15 ENCOUNTER — ALLIED HEALTH/NURSE VISIT (OUTPATIENT)
Dept: NURSING | Facility: CLINIC | Age: 28
End: 2018-06-15
Payer: COMMERCIAL

## 2018-06-15 DIAGNOSIS — Z23 NEED FOR HEPATITIS B VACCINATION: Primary | ICD-10-CM

## 2018-06-15 PROCEDURE — 90746 HEPB VACCINE 3 DOSE ADULT IM: CPT

## 2018-06-15 PROCEDURE — 90471 IMMUNIZATION ADMIN: CPT

## 2018-08-13 ENCOUNTER — OFFICE VISIT (OUTPATIENT)
Dept: OPTOMETRY | Facility: CLINIC | Age: 28
End: 2018-08-13
Payer: COMMERCIAL

## 2018-08-13 DIAGNOSIS — H52.203 MYOPIA OF BOTH EYES WITH ASTIGMATISM: Primary | ICD-10-CM

## 2018-08-13 DIAGNOSIS — H52.13 MYOPIA OF BOTH EYES WITH ASTIGMATISM: Primary | ICD-10-CM

## 2018-08-13 PROCEDURE — 92004 COMPRE OPH EXAM NEW PT 1/>: CPT | Performed by: OPTOMETRIST

## 2018-08-13 PROCEDURE — 92015 DETERMINE REFRACTIVE STATE: CPT | Performed by: OPTOMETRIST

## 2018-08-13 ASSESSMENT — REFRACTION_WEARINGRX
OS_AXIS: 080
OD_SPHERE: -1.50
OD_AXIS: 102
OS_CYLINDER: +1.50
OS_SPHERE: -2.00
OD_CYLINDER: +0.25
SPECS_TYPE: SVL

## 2018-08-13 ASSESSMENT — VISUAL ACUITY
OD_SC+: -1
OD_SC: 20/40
OS_CC: 20/20
OD_CC: 20/20
OS_CC: 20/20
METHOD: SNELLEN - LINEAR
OD_CC: 20/20
OS_SC+: -1
OS_SC: 20/30
CORRECTION_TYPE: GLASSES

## 2018-08-13 ASSESSMENT — EXTERNAL EXAM - RIGHT EYE: OD_EXAM: NORMAL

## 2018-08-13 ASSESSMENT — REFRACTION_MANIFEST
OD_CYLINDER: +0.50
OD_AXIS: 126
OS_CYLINDER: +1.25
OD_SPHERE: -1.75
METHOD_AUTOREFRACTION: 1
OS_SPHERE: -2.25
OS_SPHERE: -1.25
OD_CYLINDER: +0.50
OD_SPHERE: -2.00
OS_CYLINDER: +0.75
OS_AXIS: 083
OS_AXIS: 88
OD_AXIS: 110

## 2018-08-13 ASSESSMENT — TONOMETRY: IOP_METHOD: BOTH EYES NORMAL BY PALPATION

## 2018-08-13 ASSESSMENT — CUP TO DISC RATIO
OS_RATIO: 0.1
OD_RATIO: 0.2

## 2018-08-13 ASSESSMENT — SLIT LAMP EXAM - LIDS
COMMENTS: NORMAL
COMMENTS: NORMAL

## 2018-08-13 ASSESSMENT — CONF VISUAL FIELD
OD_NORMAL: 1
OS_NORMAL: 1

## 2018-08-13 ASSESSMENT — EXTERNAL EXAM - LEFT EYE: OS_EXAM: NORMAL

## 2018-08-13 NOTE — MR AVS SNAPSHOT
After Visit Summary   8/13/2018    Anny Aragon    MRN: [id number]           Patient Information     Date Of Birth          1990        Visit Information        Provider Department      8/13/2018 8:40 AM Fernanda Medina OD [facility]        Today's Diagnoses     Myopia of both eyes with astigmatism    -  1      Care Instructions    Very mild prescription change          Follow-ups after your visit        Follow-up notes from your care team     Return in about 2 years (around 8/13/2020).      Who to contact     If you have questions or need follow up information about today's clinic visit or your schedule please contact [facility] directly at [phone].  Normal or non-critical lab and imaging results will be communicated to you by MyChart, letter or phone within 4 business days after the clinic has received the results. If you do not hear from us within 7 days, please contact the clinic through MyChart or phone. If you have a critical or abnormal lab result, we will notify you by phone as soon as possible.  Submit refill requests through [employer] or call your pharmacy and they will forward the refill request to us. Please allow 3 business days for your refill to be completed.          Additional Information About Your Visit        Care EveryWhere ID     This is your Care EveryWhere ID. This could be used by other organizations to access your [place] medical records  [id number]         Blood Pressure from Last 3 Encounters:   03/27/18 122/84   01/03/18 149/90   12/21/17 (!) 155/98    Weight from Last 3 Encounters:   03/27/18 136.5 kg (301 lb)   01/03/18 132.3 kg (291 lb 9.6 oz)   12/21/17 132 kg (291 lb)              We Performed the Following     EYE EXAM (SIMPLE-NONBILLABLE)     REFRACTION        Primary Care Provider Office Phone # Fax #    Kirsten Bolivar PA-C [phone] [address] [facility] [address]        Equal Access  to Services     GIACOMO LANGFORD : Sol Pryor, waluzda arlineqadaha, qaybta kaalmaniharika bales. So [facility] [phone].    ATENCIÓN: Si emilie lynn, tiene a rey disposición servicios gratuitos de asistencia lingüística. Llame al [phone].    We comply with applicable federal civil rights laws and [place] laws. We do not discriminate on the basis of race, color, national origin, age, disability, sex, sexual orientation, or gender identity.            Thank you!     Thank you for choosing [facility]  for your care. Our goal is always to provide you with excellent care. Hearing back from our patients is one way we can continue to improve our services. Please take a few minutes to complete the written survey that you may receive in the mail after your visit with us. Thank you!             Your Updated Medication List - Protect others around you: Learn how to safely use, store and throw away your medicines at www.disposemymeds.org.          This list is accurate as of 8/13/18 10:48 AM.  Always use your most recent med list.                   Brand Name Dispense Instructions for use Diagnosis    albuterol 108 (90 Base) MCG/ACT inhaler    PROAIR HFA/PROVENTIL HFA/VENTOLIN HFA    1 Inhaler    Inhale 2 puffs into the lungs every 6 hours as needed for shortness of breath / dyspnea or wheezing    SOB (shortness of breath)       amLODIPine 5 MG tablet    NORVASC    90 tablet    TAKE 1 TABLET(5 MG) BY MOUTH DAILY    Benign essential hypertension       levonorgestrel-ethinyl estradiol 0.1-20 MG-MCG per tablet    AVIANE,JOHNATHONLESSINA    84 tablet    Take 1 tablet by mouth daily    PCOS (polycystic ovarian syndrome)       lisinopril-hydrochlorothiazide 20-12.5 MG per tablet    PRINZIDE/ZESTORETIC    90 tablet    Take 1 tablet by mouth daily    Benign essential hypertension       metFORMIN 500 MG 24 hr tablet    GLUCOPHAGE-XR    90 tablet    Take 1 tablet (500  mg) by mouth daily (with dinner)    PCOS (polycystic ovarian syndrome)

## 2018-08-13 NOTE — PROGRESS NOTES
Chief Complaint   Patient presents with     COMPREHENSIVE EYE EXAM      No concerns does not want any eye drops    Last Eye Exam: 2yrs, non contact tonometry, normal   Dilated Previously: No, side effects of dilation explained today    What are you currently using to see?  glasses       Distance Vision Acuity: Satisfied with vision    Near Vision Acuity: Satisfied with vision while reading  with glasses    Eye Comfort: good  Do you use eye drops? 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       OBJECTIVE: See Ophthalmology exam    ASSESSMENT:    ICD-10-CM    1. Myopia of both eyes with astigmatism H52.13 EYE EXAM (SIMPLE-NONBILLABLE)    H52.203 REFRACTION      PLAN:   Updated prescription     Fernanda Medina OD

## 2018-08-13 NOTE — LETTER
8/13/2018         RE: Anny Aragon  [address]  [place] [address]        Dear Colleague,    Thank you [facility]. Please see a copy of my visit note below.    Chief Complaint   Patient presents with     COMPREHENSIVE EYE EXAM      No concerns does not want any eye drops    Last Eye Exam: 2yrs, non contact tonometry, normal   Dilated Previously: No, side effects of dilation explained today    What are you currently using to see?  glasses       Distance Vision Acuity: Satisfied with vision    Near Vision Acuity: Satisfied with vision while reading  with glasses    Eye Comfort: good  Do you use eye drops? 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       OBJECTIVE: See Ophthalmology exam    ASSESSMENT:  No diagnosis found.   PLAN:       Fernanda Medina OD     Again, thank you for allowing me to participate in the care of your patient.        Sincerely,        Fernanda Medina, OD

## 2018-11-09 DIAGNOSIS — I10 BENIGN ESSENTIAL HYPERTENSION: ICD-10-CM

## 2018-11-09 RX ORDER — AMLODIPINE BESYLATE 5 MG/1
TABLET ORAL
Qty: 14 TABLET | Refills: 0 | Status: SHIPPED | OUTPATIENT
Start: 2018-11-09 | End: 2018-11-16

## 2018-11-09 NOTE — TELEPHONE ENCOUNTER
Patient calling and took last pill today.  Appt next Friday.  2 week supply sent.  Natasha Gage RN

## 2018-11-16 ENCOUNTER — OFFICE VISIT (OUTPATIENT)
Dept: FAMILY MEDICINE | Facility: CLINIC | Age: 28
End: 2018-11-16
Payer: COMMERCIAL

## 2018-11-16 VITALS
DIASTOLIC BLOOD PRESSURE: 86 MMHG | WEIGHT: 293 LBS | HEART RATE: 75 BPM | OXYGEN SATURATION: 97 % | RESPIRATION RATE: 16 BRPM | TEMPERATURE: 97.8 F | SYSTOLIC BLOOD PRESSURE: 122 MMHG | BODY MASS INDEX: 52.52 KG/M2

## 2018-11-16 DIAGNOSIS — I10 BENIGN ESSENTIAL HYPERTENSION: ICD-10-CM

## 2018-11-16 DIAGNOSIS — E28.2 PCOS (POLYCYSTIC OVARIAN SYNDROME): ICD-10-CM

## 2018-11-16 LAB
ANION GAP SERPL CALCULATED.3IONS-SCNC: 8 MMOL/L (ref 3–14)
BUN SERPL-MCNC: 11 MG/DL (ref 7–30)
CALCIUM SERPL-MCNC: 9.3 MG/DL (ref 8.5–10.1)
CHLORIDE SERPL-SCNC: 103 MMOL/L (ref 94–109)
CO2 SERPL-SCNC: 26 MMOL/L (ref 20–32)
CREAT SERPL-MCNC: 0.68 MG/DL (ref 0.52–1.04)
GFR SERPL CREATININE-BSD FRML MDRD: >90 ML/MIN/1.7M2
GLUCOSE SERPL-MCNC: 71 MG/DL (ref 70–99)
HBA1C MFR BLD: 5.9 % (ref 0–5.6)
POTASSIUM SERPL-SCNC: 4 MMOL/L (ref 3.4–5.3)
SODIUM SERPL-SCNC: 137 MMOL/L (ref 133–144)

## 2018-11-16 PROCEDURE — 80048 BASIC METABOLIC PNL TOTAL CA: CPT | Performed by: PHYSICIAN ASSISTANT

## 2018-11-16 PROCEDURE — 36415 COLL VENOUS BLD VENIPUNCTURE: CPT | Performed by: PHYSICIAN ASSISTANT

## 2018-11-16 PROCEDURE — 99214 OFFICE O/P EST MOD 30 MIN: CPT | Performed by: PHYSICIAN ASSISTANT

## 2018-11-16 PROCEDURE — 83036 HEMOGLOBIN GLYCOSYLATED A1C: CPT | Performed by: PHYSICIAN ASSISTANT

## 2018-11-16 RX ORDER — LEVONORGESTREL/ETHIN.ESTRADIOL 0.1-0.02MG
1 TABLET ORAL DAILY
Qty: 84 TABLET | Refills: 3 | Status: SHIPPED | OUTPATIENT
Start: 2018-11-16 | End: 2021-03-10 | Stop reason: ALTCHOICE

## 2018-11-16 RX ORDER — AMLODIPINE BESYLATE 5 MG/1
TABLET ORAL
Qty: 90 TABLET | Refills: 3 | Status: SHIPPED | OUTPATIENT
Start: 2018-11-16 | End: 2021-03-10

## 2018-11-16 RX ORDER — LISINOPRIL AND HYDROCHLOROTHIAZIDE 12.5; 2 MG/1; MG/1
1 TABLET ORAL DAILY
Qty: 90 TABLET | Refills: 3 | Status: SHIPPED | OUTPATIENT
Start: 2018-11-16 | End: 2021-03-10

## 2018-11-16 RX ORDER — METFORMIN HCL 500 MG
500 TABLET, EXTENDED RELEASE 24 HR ORAL
Qty: 90 TABLET | Refills: 3 | Status: SHIPPED | OUTPATIENT
Start: 2018-11-16 | End: 2021-10-14

## 2018-11-16 NOTE — LETTER
November 20, 2018      Anny Aragon  [address]  [facility] [address]        Dear Anny Miller,     Your electrolytes, blood sugar and kidney function were normal.   Hemoglobin A1c (average blood sugars over 3 months) shows you have pre-diabetes (5.7-6.4%).   Please work on increasing exercise and monitoring your diet to help with this. I recommend we recheck this in 1 year.     Take Care,   Kirsten Bolivar PA-C     Resulted Orders   Basic metabolic panel  (Ca, Cl, CO2, Creat, Gluc, K, Na, BUN)   Result Value Ref Range    Sodium 137 133 - 144 mmol/L    Potassium 4.0 3.4 - 5.3 mmol/L    Chloride 103 94 - 109 mmol/L    Carbon Dioxide 26 20 - 32 mmol/L    Anion Gap 8 3 - 14 mmol/L    Glucose 71 70 - 99 mg/dL      Comment:      Non Fasting    Urea Nitrogen 11 7 - 30 mg/dL    Creatinine 0.68 0.52 - 1.04 mg/dL    GFR Estimate >90 >60 mL/min/1.7m2      Comment:      Non  GFR Calc    GFR Estimate If Black >90 >60 mL/min/1.7m2      Comment:       GFR Calc    Calcium 9.3 8.5 - 10.1 mg/dL   Hemoglobin A1c   Result Value Ref Range    Hemoglobin A1C 5.9 (H) 0 - 5.6 %      Comment:      Normal <5.7% Prediabetes 5.7-6.4%  Diabetes 6.5% or higher - adopted from ADA   consensus guidelines.         If you have any questions or concerns, please call the clinic at the number listed above.       Sincerely,        Kirsten Bolivar PA-C/IRENE

## 2018-11-16 NOTE — MR AVS SNAPSHOT
After Visit Summary   11/16/2018    Anny Aragon    MRN: [id number]           Patient Information     Date Of Birth          1990        Visit Information        Provider Department      11/16/2018 11:30 AM Kirsten Bolivar PA-C [facility]        Today's Diagnoses     Screening for HIV (human immunodeficiency virus)        Need for prophylactic vaccination and inoculation against influenza           Follow-ups after your visit        Your next 10 appointments already scheduled     Nov 16, 2018 11:30 AM CST   SHORT with Kirsten Bolivar PA-C   [facility] ([facility])    [address] [place] [id number] [phone]              Who to contact     If you have questions or need follow up information about today's clinic visit or your schedule please contact [facility] directly at [phone].  Normal or non-critical lab and imaging results will be communicated to you by MyChart, letter or phone within 4 business days after the clinic has received the results. If you do not hear from us within 7 days, please contact the clinic through MyChart or phone. If you have a critical or abnormal lab result, we will notify you by phone as soon as possible.  Submit refill requests through [employer] Twitter Dashboard or call your pharmacy and they will forward the refill request to us. Please allow 3 business days for your refill to be completed.          Additional Information About Your Visit        Care EveryWhere ID     This is your Care EveryWhere ID. This could be used by other organizations to access your [place] medical records  [id number]        Your Vitals Were     Pulse Temperature Respirations Pulse Oximetry BMI (Body Mass Index)       75 97.8  F (36.6  C) (Oral) 16 97% 52.52 kg/m2        Blood Pressure from Last 3 Encounters:   11/16/18 122/86   03/27/18 122/84   01/03/18 149/90    Weight from Last 3 Encounters:    11/16/18 306 lb (138.8 kg)   03/27/18 301 lb (136.5 kg)   01/03/18 291 lb 9.6 oz (132.3 kg)              Today, you had the following     No orders found for display       Primary Care Provider Office Phone # Fax #    Kirsten DESTINEE Bolivar PA-C [phone] [address] [facility] [address]        Equal Access to Services     GIACOMO LANGFORD : Hadii aad ku hadasho Soomaali, waaxda luqadaha, qaybta kaalmada adeegyada, waxay idiin hayaan adeeg kharash latravis . So [facility] [phone].    ATENCIÓN: Si habla espfranca, tiene a rey disposición servicios gratuitos de asistencia lingüística. Llame al [phone].    We comply with applicable federal civil rights laws and [place] laws. We do not discriminate on the basis of race, color, national origin, age, disability, sex, sexual orientation, or gender identity.            Thank you!     Thank you for choosing [facility]  for your care. Our goal is always to provide you with excellent care. Hearing back from our patients is one way we can continue to improve our services. Please take a few minutes to complete the written survey that you may receive in the mail after your visit with us. Thank you!             Your Updated Medication List - Protect others around you: Learn how to safely use, store and throw away your medicines at www.disposemymeds.org.          This list is accurate as of 11/16/18 11:27 AM.  Always use your most recent med list.                   Brand Name Dispense Instructions for use Diagnosis    albuterol 108 (90 Base) MCG/ACT inhaler    PROAIR HFA/PROVENTIL HFA/VENTOLIN HFA    1 Inhaler    Inhale 2 puffs into the lungs every 6 hours as needed for shortness of breath / dyspnea or wheezing    SOB (shortness of breath)       amLODIPine 5 MG tablet    NORVASC    14 tablet    TAKE 1 TABLET(5 MG) BY MOUTH DAILY    Benign essential hypertension       levonorgestrel-ethinyl estradiol 0.1-20 MG-MCG per tablet     JOHNATHON MATTHEWS LESSINA    84 tablet    Take 1 tablet by mouth daily    PCOS (polycystic ovarian syndrome)       lisinopril-hydrochlorothiazide 20-12.5 MG per tablet    PRINZIDE/ZESTORETIC    90 tablet    Take 1 tablet by mouth daily    Benign essential hypertension       metFORMIN 500 MG 24 hr tablet    GLUCOPHAGE-XR    90 tablet    Take 1 tablet (500 mg) by mouth daily (with dinner)    PCOS (polycystic ovarian syndrome)

## 2018-11-16 NOTE — LETTER
My Asthma Action Plan  Name: Anny Aragon   YOB: 1990  Date: 11/16/2018   My doctor: Kirsten Bolivar PA-C   My clinic: [facility]        My Control Medicine:   My Rescue Medicine:    My Asthma Severity:   Avoid your asthma triggers:                GREEN ZONE   Good Control    I feel good    No cough or wheeze    Can work, sleep and play without asthma symptoms       Take your asthma control medicine every day.     1. If exercise triggers your asthma, take your rescue medication    15 minutes before exercise or sports, and    During exercise if you have asthma symptoms  2. Spacer to use with inhaler: If you have a spacer, make sure to use it with your inhaler             YELLOW ZONE Getting Worse  I have ANY of these:    I do not feel good    Cough or wheeze    Chest feels tight    Wake up at night   1. Keep taking your Green Zone medications  2. Start taking your rescue medicine:    every 20 minutes for up to 1 hour. Then every 4 hours for 24-48 hours.  3. If you stay in the Yellow Zone for more than 12-24 hours, contact your doctor.  4. If you do not return to the Green Zone in 12-24 hours or you get worse, start taking your oral steroid medicine if prescribed by your provider.           RED ZONE Medical Alert - Get Help  I have ANY of these:    I feel awful    Medicine is not helping    Breathing getting harder    Trouble walking or talking    Nose opens wide to breathe       1. Take your rescue medicine NOW  2. If your provider has prescribed an oral steroid medicine, start taking it NOW  3. Call your doctor NOW  4. If you are still in the Red Zone after 20 minutes and you have not reached your doctor:    Take your rescue medicine again and    Call 911 or go to the emergency room right away    See your regular doctor within 2 weeks of an Emergency Room or Urgent Care visit for follow-up treatment.          Annual Reminders:  Meet with Asthma Educator,  Flu Shot in the  Fall, consider Pneumonia Vaccination for patients with asthma (aged 19 and older).    Pharmacy:    [facility] [address] [place], [place] - [address] AT [facility] & [address] [place] [address] DRUG STORE [address] [place], [place] - [address] AT [address] [place] [address]                      Asthma Triggers  How To Control Things That Make Your Asthma Worse    Triggers are things that make your asthma worse.  Look at the list below to help you find your triggers and what you can do about them.  You can help prevent asthma flare-ups by staying away from your triggers.      Trigger                                                          What you can do   Cigarette Smoke  Tobacco smoke can make asthma worse. Do not allow smoking in your home, car or around you.  Be sure no one smokes at a child s day care or school.  If you smoke, ask your health care provider for ways to help you quit.  Ask family members to quit too.  Ask your health care provider for a referral to Quit Plan to help you quit smoking, or call 6-181-634-PLAN.     Colds, Flu, Bronchitis  These are common triggers of asthma. Wash your hands often.  Don t touch your eyes, nose or mouth.  Get a flu shot every year.     Dust Mites  These are tiny bugs that live in cloth or carpet. They are too small to see. Wash sheets and blankets in hot water every week.   Encase pillows and mattress in dust mite proof covers.  Avoid having carpet if you can. If you have carpet, vacuum weekly.   Use a dust mask and HEPA vacuum.   Pollen and Outdoor Mold  Some people are allergic to trees, grass, or weed pollen, or molds. Try to keep your windows closed.  Limit time out doors when pollen count is high.   Ask you health care provider about taking medicine during allergy season.     Animal Dander  Some people are allergic to skin flakes, urine or saliva from pets with fur or feathers. Keep pets with fur or feathers out of your home.    If you can t keep the pet outdoors,  then keep the pet out of your bedroom.  Keep the bedroom door closed.  Keep pets off cloth furniture and away from stuffed toys.     Mice, Rats, and Cockroaches  Some people are allergic to the waste from these pests.   Cover food and garbage.  Clean up spills and food crumbs.  Store grease in the refrigerator.   Keep food out of the bedroom.   Indoor Mold  This can be a trigger if your home has high moisture. Fix leaking faucets, pipes, or other sources of water.   Clean moldy surfaces.  Dehumidify basement if it is damp and smelly.   Smoke, Strong Odors, and Sprays  These can reduce air quality. Stay away from strong odors and sprays, such as perfume, powder, hair spray, paints, smoke incense, paint, cleaning products, candles and new carpet.   Exercise or Sports  Some people with asthma have this trigger. Be active!  Ask your doctor about taking medicine before sports or exercise to prevent symptoms.    Warm up for 5-10 minutes before and after sports or exercise.     Other Triggers of Asthma  Cold air:  Cover your nose and mouth with a scarf.  Sometimes laughing or crying can be a trigger.  Some medicines and food can trigger asthma.

## 2018-11-16 NOTE — PROGRESS NOTES
SUBJECTIVE:   Anny Aragon is a 28 year old female who presents to clinic today for the following health issues:      Hypertension Follow-up      Outpatient blood pressures are being checked at work.  Results are 120's/90's.    Low Salt Diet: no added salt      Amount of exercise or physical activity: None    Problems taking medications regularly: No    Medication side effects: none    Diet: regular (no restrictions)        Medication Followup of metformin. Uses for PCOS    Taking Medication as prescribed: yes    Side Effects:  None    Medication Helping Symptoms:  yes       Problem list and histories reviewed & adjusted, as indicated.  Additional history: as documented    Patient Active Problem List   Diagnosis     CARDIOVASCULAR SCREENING; LDL GOAL LESS THAN 160     Obesity     Exercise-induced asthma     PCOS (polycystic ovarian syndrome)     Vitamin D deficiency disease     Emotional lability     Morbid obesity (H)     Benign essential hypertension     Past Surgical History:   Procedure Laterality Date     NO HISTORY OF SURGERY         Social History   Substance Use Topics     Smoking status: Never Smoker     Smokeless tobacco: Never Used     Alcohol use No     Family History   Problem Relation Age of Onset     Unknown/Adopted Mother      Unknown/Adopted Father      Glaucoma No family hx of      Macular Degeneration No family hx of            Reviewed and updated as needed this visit by clinical staff  Tobacco  Allergies  Meds  Med Hx  Surg Hx  Fam Hx  Soc Hx      Reviewed and updated as needed this visit by Provider         ROS:  Constitutional, HEENT, cardiovascular, pulmonary, gi and gu systems are negative, except as otherwise noted.    OBJECTIVE:     /86 (BP Location: Right arm, Patient Position: Chair, Cuff Size: Adult Large)  Pulse 75  Temp 97.8  F (36.6  C) (Oral)  Resp 16  Wt 306 lb (138.8 kg)  SpO2 97%  BMI 52.52 kg/m2  Body mass index is 52.52 kg/(m^2).  GENERAL APPEARANCE:  healthy, alert and no distress  RESP: lungs clear to auscultation - no rales, rhonchi or wheezes  CV: regular rates and rhythm, normal S1 S2, no S3 or S4 and no murmur, click or rub  PSYCH: mentation appears normal and affect normal/bright        ASSESSMENT/PLAN:             1. PCOS (polycystic ovarian syndrome)  Stable.   - metFORMIN (GLUCOPHAGE-XR) 500 MG 24 hr tablet; Take 1 tablet (500 mg) by mouth daily (with dinner)  Dispense: 90 tablet; Refill: 3  - levonorgestrel-ethinyl estradiol (AVIANE,ALESSE,LESSINA) 0.1-20 MG-MCG per tablet; Take 1 tablet by mouth daily  Dispense: 84 tablet; Refill: 3  - Hemoglobin A1c    2. Benign essential hypertension  Stable.   - lisinopril-hydrochlorothiazide (PRINZIDE/ZESTORETIC) 20-12.5 MG per tablet; Take 1 tablet by mouth daily  Dispense: 90 tablet; Refill: 3  - amLODIPine (NORVASC) 5 MG tablet; TAKE 1 TABLET(5 MG) BY MOUTH DAILY  Dispense: 90 tablet; Refill: 3  - Basic metabolic panel  (Ca, Cl, CO2, Creat, Gluc, K, Na, BUN)        Kirsten Bolivar PA-C  [facility]

## 2018-11-17 ASSESSMENT — ASTHMA QUESTIONNAIRES: ACT_TOTALSCORE: 23

## 2019-01-01 ENCOUNTER — OFFICE VISIT (OUTPATIENT)
Dept: URGENT CARE | Facility: URGENT CARE | Age: 29
End: 2019-01-01
Payer: COMMERCIAL

## 2019-01-01 VITALS
RESPIRATION RATE: 16 BRPM | OXYGEN SATURATION: 97 % | SYSTOLIC BLOOD PRESSURE: 134 MMHG | HEART RATE: 70 BPM | BODY MASS INDEX: 52.11 KG/M2 | TEMPERATURE: 98.3 F | WEIGHT: 293 LBS | DIASTOLIC BLOOD PRESSURE: 92 MMHG

## 2019-01-01 DIAGNOSIS — B00.1 COLD SORE: Primary | ICD-10-CM

## 2019-01-01 PROCEDURE — 99213 OFFICE O/P EST LOW 20 MIN: CPT | Performed by: FAMILY MEDICINE

## 2019-01-01 RX ORDER — ACYCLOVIR 400 MG/1
400 TABLET ORAL 3 TIMES DAILY
Qty: 30 TABLET | Refills: 0 | Status: SHIPPED | OUTPATIENT
Start: 2019-01-01 | End: 2021-03-10

## 2019-01-01 NOTE — PROGRESS NOTES
SUBJECTIVE:   Anny Aragon is a 28 year old female presenting with a chief complaint of lower lip swelling which started as a blister.  Currently she has been noticing a lot of pain along with swelling of the lower lip with whitish yellow colored exudate.  She has used over-the-counter topical medication.  She denies any other systemic symptoms has no URI symptoms.. She is an established patient of [facility].  Onset of symptoms was 1 day(s) ago.  Course of illness is worsening.    Severity moderate  Current and Associated symptoms: Cold sores in the lip  Treatment measures tried include topical cream  Predisposing factors include None.    Past Medical History:   Diagnosis Date     Obesity      PCOS (polycystic ovarian syndrome)      Current Outpatient Medications   Medication Sig Dispense Refill     acyclovir (ZOVIRAX) 400 MG tablet Take 1 tablet (400 mg) by mouth 3 times daily for 10 days 30 tablet 0     albuterol (PROAIR HFA/PROVENTIL HFA/VENTOLIN HFA) 108 (90 BASE) MCG/ACT Inhaler Inhale 2 puffs into the lungs every 6 hours as needed for shortness of breath / dyspnea or wheezing 1 Inhaler 0     amLODIPine (NORVASC) 5 MG tablet TAKE 1 TABLET(5 MG) BY MOUTH DAILY 90 tablet 3     levonorgestrel-ethinyl estradiol (AVIANE,ALESSE,LESSINA) 0.1-20 MG-MCG per tablet Take 1 tablet by mouth daily 84 tablet 3     lisinopril-hydrochlorothiazide (PRINZIDE/ZESTORETIC) 20-12.5 MG per tablet Take 1 tablet by mouth daily 90 tablet 3     metFORMIN (GLUCOPHAGE-XR) 500 MG 24 hr tablet Take 1 tablet (500 mg) by mouth daily (with dinner) 90 tablet 3     albuterol (PROAIR HFA, PROVENTIL HFA, VENTOLIN HFA) 108 (90 BASE) MCG/ACT inhaler Inhale 2 puffs into the lungs every 6 hours as needed for shortness of breath / dyspnea       Social History     Tobacco Use     Smoking status: Never Smoker     Smokeless tobacco: Never Used   Substance Use Topics     Alcohol use: No     Family History   Problem Relation Age of Onset      Unknown/Adopted Mother      Unknown/Adopted Father      Glaucoma No family hx of      Macular Degeneration No family hx of          ROS:    10 point ROS of systems including Constitutional, Eyes, Respiratory, Cardiovascular, Gastroenterology, Genitourinary, Integumentary, Muscularskeletal, Psychiatric were all negative except for pertinent positives noted in my HPI         OBJECTIVE:  BP (!) 134/92 (BP Location: Right arm, Patient Position: Chair, Cuff Size: Adult Large)   Pulse 70   Temp 98.3  F (36.8  C) (Oral)   Resp 16   Wt 137.7 kg (303 lb 9.6 oz)   SpO2 97%   BMI 52.11 kg/m    GENERAL APPEARANCE: healthy, alert and no distress  EYES: EOMI,  PERRL, conjunctiva clear  HENT: ear canals and TM's normal.  Nose and mouth- lower lip swelling with blister with yellow coloured exudate noted with  erythema NECK: supple, nontender, no lymphadenopathy  RESP: lungs clear to auscultation - no rales, rhonchi or wheezes  CV: regular rates and rhythm, normal S1 S2, no murmur noted  ABDOMEN:  soft, nontender, no HSM or masses and bowel sounds normal  SKIN: no suspicious lesions or rashes  Physical Exam        ASSESSMENT:  Anny was seen today for urgent care and mouth/lip problem.    Diagnoses and all orders for this visit:    Cold sore  -     acyclovir (ZOVIRAX) 400 MG tablet; Take 1 tablet (400 mg) by mouth 3 times daily for 10 days          PLAN:  Tylenol, Ibuprofen, Fluids and Rest  Discussed with patient the symptoms seem to be related to cold sores  Discussed the viral infection caused by herpes virus  If notices any worsening redness swelling pain should notify as might need to be treated with an antibiotic for secondary bacterial infection patient understood and agreed with plan    Krystal Perales MD     See orders in Epic

## 2021-03-10 ENCOUNTER — OFFICE VISIT (OUTPATIENT)
Dept: FAMILY MEDICINE | Facility: CLINIC | Age: 31
End: 2021-03-10
Payer: COMMERCIAL

## 2021-03-10 VITALS
TEMPERATURE: 98.7 F | RESPIRATION RATE: 14 BRPM | BODY MASS INDEX: 52.52 KG/M2 | SYSTOLIC BLOOD PRESSURE: 130 MMHG | DIASTOLIC BLOOD PRESSURE: 88 MMHG | OXYGEN SATURATION: 97 % | HEART RATE: 74 BPM | WEIGHT: 293 LBS

## 2021-03-10 DIAGNOSIS — E66.01 MORBID OBESITY (H): ICD-10-CM

## 2021-03-10 DIAGNOSIS — E28.2 PCOS (POLYCYSTIC OVARIAN SYNDROME): ICD-10-CM

## 2021-03-10 DIAGNOSIS — I10 BENIGN ESSENTIAL HYPERTENSION: Primary | ICD-10-CM

## 2021-03-10 DIAGNOSIS — R10.84 ABDOMINAL PAIN, GENERALIZED: ICD-10-CM

## 2021-03-10 LAB — HBA1C MFR BLD: 5.8 % (ref 0–5.6)

## 2021-03-10 PROCEDURE — 86256 FLUORESCENT ANTIBODY TITER: CPT | Mod: 90 | Performed by: PHYSICIAN ASSISTANT

## 2021-03-10 PROCEDURE — 99214 OFFICE O/P EST MOD 30 MIN: CPT | Performed by: PHYSICIAN ASSISTANT

## 2021-03-10 PROCEDURE — 83516 IMMUNOASSAY NONANTIBODY: CPT | Performed by: PHYSICIAN ASSISTANT

## 2021-03-10 PROCEDURE — 80053 COMPREHEN METABOLIC PANEL: CPT | Performed by: PHYSICIAN ASSISTANT

## 2021-03-10 PROCEDURE — 99000 SPECIMEN HANDLING OFFICE-LAB: CPT | Performed by: PHYSICIAN ASSISTANT

## 2021-03-10 PROCEDURE — 83036 HEMOGLOBIN GLYCOSYLATED A1C: CPT | Performed by: PHYSICIAN ASSISTANT

## 2021-03-10 PROCEDURE — 36415 COLL VENOUS BLD VENIPUNCTURE: CPT | Performed by: PHYSICIAN ASSISTANT

## 2021-03-10 RX ORDER — LEVONORGESTREL AND ETHINYL ESTRADIOL 0.15-0.03
1 KIT ORAL DAILY
Qty: 91 TABLET | Refills: 3 | Status: SHIPPED | OUTPATIENT
Start: 2021-03-10

## 2021-03-10 RX ORDER — AMLODIPINE BESYLATE 5 MG/1
TABLET ORAL
Qty: 90 TABLET | Refills: 3 | Status: SHIPPED | OUTPATIENT
Start: 2021-03-10 | End: 2022-03-07

## 2021-03-10 RX ORDER — LISINOPRIL AND HYDROCHLOROTHIAZIDE 12.5; 2 MG/1; MG/1
1 TABLET ORAL DAILY
Qty: 90 TABLET | Refills: 3 | Status: SHIPPED | OUTPATIENT
Start: 2021-03-10 | End: 2022-03-07

## 2021-03-10 NOTE — PROGRESS NOTES
Assessment & Plan     Benign essential hypertension    - lisinopril-hydrochlorothiazide (ZESTORETIC) 20-12.5 MG tablet; Take 1 tablet by mouth daily  - amLODIPine (NORVASC) 5 MG tablet; TAKE 1 TABLET(5 MG) BY MOUTH DAILY  - Comprehensive metabolic panel (BMP + Alb, Alk Phos, ALT, AST, Total. Bili, TP)    Morbid obesity (H)  Diet and exercise    PCOS (polycystic ovarian syndrome)  Discussed metformin use, await labs.   - levonorgestrel-ethinyl estradiol (SEASONALE) 0.15-0.03 MG tablet; Take 1 tablet by mouth daily  - Hemoglobin A1c    Abdominal pain, generalized  Patient interested in testing due to intermittent GI issues.  Patient is adopted and does not know family history  - Tissue transglutaminase antibody IgA  - Deamidated Giladin Peptide Brenda IgA IgG  - Endomysial Antibody IgA by IFA        No follow-ups on file.    DARIELA العراقي [facility]    Samuel Mccormick is a 30 year old who presents for the following health issues     HPI       Medication Followup of lisinopril and amlodipine    Taking Medication as prescribed: yes    Side Effects:  None    Medication Helping Symptoms:  NO   Pt also wants to get on birth control to help with PCOS . Wants to restart OCP    Hypertension Follow-up      Do you check your blood pressure regularly outside of the clinic? Yes     Are you following a low salt diet? No    Are your blood pressures ever more than 140 on the top number (systolic) OR more   than 90 on the bottom number (diastolic), for example 140/90? No                Review of Systems   Constitutional, HEENT, cardiovascular, pulmonary, gi and gu systems are negative, except as otherwise noted.      Objective    /88 (BP Location: Right arm, Patient Position: Sitting, Cuff Size: Adult Large)   Pulse 74   Temp 98.7  F (37.1  C) (Oral)   Resp 14   Wt 138.8 kg (306 lb)   LMP  (Within Months)   SpO2 97%   BMI 52.52 kg/m    Body mass index is 52.52 kg/m .  Physical  Exam   GENERAL APPEARANCE: healthy, alert and no distress  RESP: lungs clear to auscultation - no rales, rhonchi or wheezes  CV: regular rates and rhythm, normal S1 S2, no S3 or S4 and no murmur, click or rub  NEURO: Normal strength and tone, mentation intact and speech normal  PSYCH: mentation appears normal and anxious

## 2021-03-11 LAB
ALBUMIN SERPL-MCNC: 3.5 G/DL (ref 3.4–5)
ALP SERPL-CCNC: 71 U/L (ref 40–150)
ALT SERPL W P-5'-P-CCNC: 49 U/L (ref 0–50)
ANION GAP SERPL CALCULATED.3IONS-SCNC: 3 MMOL/L (ref 3–14)
AST SERPL W P-5'-P-CCNC: 19 U/L (ref 0–45)
BILIRUB SERPL-MCNC: 0.4 MG/DL (ref 0.2–1.3)
BUN SERPL-MCNC: 9 MG/DL (ref 7–30)
CALCIUM SERPL-MCNC: 9.4 MG/DL (ref 8.5–10.1)
CHLORIDE SERPL-SCNC: 104 MMOL/L (ref 94–109)
CO2 SERPL-SCNC: 29 MMOL/L (ref 20–32)
CREAT SERPL-MCNC: 0.67 MG/DL (ref 0.52–1.04)
GFR SERPL CREATININE-BSD FRML MDRD: >90 ML/MIN/{1.73_M2}
GLUCOSE SERPL-MCNC: 86 MG/DL (ref 70–99)
POTASSIUM SERPL-SCNC: 3.9 MMOL/L (ref 3.4–5.3)
PROT SERPL-MCNC: 8 G/DL (ref 6.8–8.8)
SODIUM SERPL-SCNC: 136 MMOL/L (ref 133–144)

## 2021-03-11 ASSESSMENT — ASTHMA QUESTIONNAIRES: ACT_TOTALSCORE: 25

## 2021-03-12 LAB
ENDOMYSIUM IGA TITR SER IF: NORMAL {TITER}
GLIADIN IGA SER-ACNC: 2 U/ML
GLIADIN IGG SER-ACNC: 1 U/ML
TTG IGA SER-ACNC: 1 U/ML

## 2021-04-11 ENCOUNTER — HEALTH MAINTENANCE LETTER (OUTPATIENT)
Age: 31
End: 2021-04-11

## 2021-09-25 ENCOUNTER — HEALTH MAINTENANCE LETTER (OUTPATIENT)
Age: 31
End: 2021-09-25

## 2021-10-13 ASSESSMENT — ANXIETY QUESTIONNAIRES
3. WORRYING TOO MUCH ABOUT DIFFERENT THINGS: NEARLY EVERY DAY
5. BEING SO RESTLESS THAT IT IS HARD TO SIT STILL: NOT AT ALL
6. BECOMING EASILY ANNOYED OR IRRITABLE: NEARLY EVERY DAY
1. FEELING NERVOUS, ANXIOUS, OR ON EDGE: SEVERAL DAYS
7. FEELING AFRAID AS IF SOMETHING AWFUL MIGHT HAPPEN: SEVERAL DAYS
2. NOT BEING ABLE TO STOP OR CONTROL WORRYING: NEARLY EVERY DAY
7. FEELING AFRAID AS IF SOMETHING AWFUL MIGHT HAPPEN: SEVERAL DAYS
GAD7 TOTAL SCORE: 11
8. IF YOU CHECKED OFF ANY PROBLEMS, HOW DIFFICULT HAVE THESE MADE IT FOR YOU TO DO YOUR WORK, TAKE CARE OF THINGS AT HOME, OR GET ALONG WITH OTHER PEOPLE?: NOT DIFFICULT AT ALL
GAD7 TOTAL SCORE: 11
4. TROUBLE RELAXING: NOT AT ALL
GAD7 TOTAL SCORE: 11

## 2021-10-14 ENCOUNTER — VIRTUAL VISIT (OUTPATIENT)
Dept: FAMILY MEDICINE | Facility: CLINIC | Age: 31
End: 2021-10-14
Payer: COMMERCIAL

## 2021-10-14 DIAGNOSIS — F41.9 ANXIETY: Primary | ICD-10-CM

## 2021-10-14 PROCEDURE — 99213 OFFICE O/P EST LOW 20 MIN: CPT | Mod: 95 | Performed by: PHYSICIAN ASSISTANT

## 2021-10-14 RX ORDER — HYDROXYZINE HYDROCHLORIDE 25 MG/1
25 TABLET, FILM COATED ORAL 3 TIMES DAILY PRN
Qty: 60 TABLET | Refills: 1 | Status: SHIPPED | OUTPATIENT
Start: 2021-10-14

## 2021-10-14 RX ORDER — FLUOXETINE 10 MG/1
10 CAPSULE ORAL DAILY
Qty: 30 CAPSULE | Refills: 1 | Status: SHIPPED | OUTPATIENT
Start: 2021-10-14

## 2021-10-14 ASSESSMENT — ENCOUNTER SYMPTOMS
GASTROINTESTINAL NEGATIVE: 1
NEUROLOGICAL NEGATIVE: 1
NERVOUS/ANXIOUS: 1
CARDIOVASCULAR NEGATIVE: 1
RESPIRATORY NEGATIVE: 1
CONSTITUTIONAL NEGATIVE: 1

## 2021-10-14 ASSESSMENT — ANXIETY QUESTIONNAIRES: GAD7 TOTAL SCORE: 11

## 2021-10-14 NOTE — Clinical Note
Patient works in healthcare feeling very stressed/overwhelmed at work.  Of note, Patient is adopted and her adoptive father passed away from cancer June 2020. Not sure if she every had any therapy/grief counseling after he passed.

## 2021-10-14 NOTE — Clinical Note
I don't see that my team got her scheduled with you per my notes. Can you reach out to her? She is interested in [facility] services. Thanks! ~Kirsten

## 2021-10-14 NOTE — PROGRESS NOTES
Anny is a 31 year old who is being evaluated via a billable video visit.      How would you like to obtain your AVS? MyChart  If the video visit is dropped, the invitation should be resent by: Text to cell phone: [phone]  Will anyone else be joining your video visit? No    Video Start Time: 7:21 AM    Assessment & Plan     Anxiety  Will set up with [facility]  - FLUoxetine (PROZAC) 10 MG capsule; Take 1 capsule (10 mg) by mouth daily  - hydrOXYzine (ATARAX) 25 MG tablet; Take 1 tablet (25 mg) by mouth 3 times daily as needed for anxiety            Return in about 3 weeks (around 11/4/2021) for Mood/Mental Health Recheck, Video Visit.    Kirsten Bolivar PA-C  [facility]    Samuel Mccormick is a 31 year old who presents for the following health issues     Anxiety    History of Present Illness       Mental Health Follow-up:  Patient presents to follow-up on Anxiety.    Patient's anxiety since last visit has been:  Worse  The patient is having other symptoms associated with anxiety.  Any significant life events: job concerns  Patient is feeling anxious or having panic attacks.  Patient has no concerns about alcohol or drug use.     Social History  Tobacco Use    Smoking status: Never Smoker    Smokeless tobacco: Never Used  Alcohol use: No  Drug use: No      Today's PHQ-9         PHQ-9 Total Score:         PHQ-9 Q9 Thoughts of better off dead/self-harm past 2 weeks :       Thoughts of suicide or self harm:      Self-harm Plan:        Self-harm Action:          Safety concerns for self or others:           She eats 2-3 servings of fruits and vegetables daily.She consumes 0 sweetened beverage(s) daily.She exercises with enough effort to increase her heart rate 10 to 19 minutes per day.  She exercises with enough effort to increase her heart rate 3 or less days per week.   She is taking medications regularly.         Review of Systems   Constitutional: Negative.    Respiratory: Negative.   "  Cardiovascular: Negative.    Gastrointestinal: Negative.    Neurological: Negative.    Psychiatric/Behavioral: The patient is nervous/anxious.             Objective    Vitals - Patient Reported  Weight (Patient Reported): 130.6 kg (288 lb)  Height (Patient Reported): 162.6 cm (5' 4\")  BMI (Based on Pt Reported Ht/Wt): 49.43        Physical Exam   GENERAL: Healthy, alert and no distress  EYES: Eyes grossly normal to inspection.  No discharge or erythema, or obvious scleral/conjunctival abnormalities.  RESP: No audible wheeze, cough, or visible cyanosis.  No visible retractions or increased work of breathing.    SKIN: Visible skin clear. No significant rash, abnormal pigmentation or lesions.  NEURO: Cranial nerves grossly intact.  Mentation and speech appropriate for age.  PSYCH: mentation appears normal, tearful and anxious                Video-Visit Details    Type of service:  Video Visit     Video End Time:7:35 AM    Originating Location (pt. Location): Home    Distant Location (provider location):  [facility]     Platform used for Video Visit: RealSelf  Answers for HPI/ROS submitted by the patient on 10/13/2021  ABHINAV 7 TOTAL SCORE: 11      "

## 2021-10-15 ASSESSMENT — ASTHMA QUESTIONNAIRES: ACT_TOTALSCORE: 25

## 2021-10-19 ASSESSMENT — PATIENT HEALTH QUESTIONNAIRE - PHQ9
SUM OF ALL RESPONSES TO PHQ QUESTIONS 1-9: 0
SUM OF ALL RESPONSES TO PHQ QUESTIONS 1-9: 0
10. IF YOU CHECKED OFF ANY PROBLEMS, HOW DIFFICULT HAVE THESE PROBLEMS MADE IT FOR YOU TO DO YOUR WORK, TAKE CARE OF THINGS AT HOME, OR GET ALONG WITH OTHER PEOPLE: NOT DIFFICULT AT ALL

## 2021-10-20 ENCOUNTER — VIRTUAL VISIT (OUTPATIENT)
Dept: BEHAVIORAL HEALTH | Facility: CLINIC | Age: 31
End: 2021-10-20
Payer: COMMERCIAL

## 2021-10-20 DIAGNOSIS — F41.9 ANXIETY: Primary | ICD-10-CM

## 2021-10-20 PROCEDURE — 90834 PSYTX W PT 45 MINUTES: CPT | Mod: 95 | Performed by: SOCIAL WORKER

## 2021-10-20 ASSESSMENT — COLUMBIA-SUICIDE SEVERITY RATING SCALE - C-SSRS
2. HAVE YOU ACTUALLY HAD ANY THOUGHTS OF KILLING YOURSELF LIFETIME?: NO
ATTEMPT LIFETIME: NO
5. HAVE YOU STARTED TO WORK OUT OR WORKED OUT THE DETAILS OF HOW TO KILL YOURSELF? DO YOU INTEND TO CARRY OUT THIS PLAN?: NO
6. HAVE YOU EVER DONE ANYTHING, STARTED TO DO ANYTHING, OR PREPARED TO DO ANYTHING TO END YOUR LIFE?: NO
4. HAVE YOU HAD THESE THOUGHTS AND HAD SOME INTENTION OF ACTING ON THEM?: NO
5. HAVE YOU STARTED TO WORK OUT OR WORKED OUT THE DETAILS OF HOW TO KILL YOURSELF? DO YOU INTEND TO CARRY OUT THIS PLAN?: NO
TOTAL  NUMBER OF ABORTED OR SELF INTERRUPTED ATTEMPTS PAST LIFETIME: NO
4. HAVE YOU HAD THESE THOUGHTS AND HAD SOME INTENTION OF ACTING ON THEM?: NO
6. HAVE YOU EVER DONE ANYTHING, STARTED TO DO ANYTHING, OR PREPARED TO DO ANYTHING TO END YOUR LIFE?: NO
1. IN THE PAST MONTH, HAVE YOU WISHED YOU WERE DEAD OR WISHED YOU COULD GO TO SLEEP AND NOT WAKE UP?: NO
TOTAL  NUMBER OF INTERRUPTED ATTEMPTS LIFETIME: NO
3. HAVE YOU BEEN THINKING ABOUT HOW YOU MIGHT KILL YOURSELF?: NO
ATTEMPT PAST THREE MONTHS: NO
1. IN THE PAST MONTH, HAVE YOU WISHED YOU WERE DEAD OR WISHED YOU COULD GO TO SLEEP AND NOT WAKE UP?: NO
2. HAVE YOU ACTUALLY HAD ANY THOUGHTS OF KILLING YOURSELF?: NO
TOTAL  NUMBER OF INTERRUPTED ATTEMPTS PAST 3 MONTHS: NO
TOTAL  NUMBER OF ABORTED OR SELF INTERRUPTED ATTEMPTS PAST 3 MONTHS: NO

## 2021-10-20 ASSESSMENT — PATIENT HEALTH QUESTIONNAIRE - PHQ9: SUM OF ALL RESPONSES TO PHQ QUESTIONS 1-9: 0

## 2021-10-20 NOTE — Clinical Note
Hi, Kirsten.  Thanks for the referral.  She's sheila.  Will complete DA next session; as of today, I can't find a specific anxiety disorder for which she meets criteria.  But will treat for anxiety, at any rate.    Adriana
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
## 2021-11-03 ENCOUNTER — VIRTUAL VISIT (OUTPATIENT)
Dept: BEHAVIORAL HEALTH | Facility: CLINIC | Age: 31
End: 2021-11-03
Payer: COMMERCIAL

## 2021-11-03 DIAGNOSIS — F41.9 ANXIETY: Primary | ICD-10-CM

## 2021-11-03 PROCEDURE — 90791 PSYCH DIAGNOSTIC EVALUATION: CPT | Mod: 95 | Performed by: SOCIAL WORKER
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
## 2021-11-07 DIAGNOSIS — F41.9 ANXIETY: ICD-10-CM

## 2021-11-09 RX ORDER — FLUOXETINE 10 MG/1
CAPSULE ORAL
Qty: 30 CAPSULE | Refills: 1 | OUTPATIENT
Start: 2021-11-09

## 2021-11-09 NOTE — TELEPHONE ENCOUNTER
Patient given a 2 month supply of medication on 10/14/21. Refusing refill request and closing encounter.     Sarah Samayoa RN on 11/9/2021 at 9:26 AM

## 2022-03-06 ENCOUNTER — MYC MEDICAL ADVICE (OUTPATIENT)
Dept: FAMILY MEDICINE | Facility: CLINIC | Age: 32
End: 2022-03-06
Payer: COMMERCIAL

## 2022-03-06 DIAGNOSIS — I10 BENIGN ESSENTIAL HYPERTENSION: ICD-10-CM

## 2022-03-07 RX ORDER — LISINOPRIL AND HYDROCHLOROTHIAZIDE 12.5; 2 MG/1; MG/1
1 TABLET ORAL DAILY
Qty: 90 TABLET | Refills: 0 | Status: SHIPPED | OUTPATIENT
Start: 2022-03-07

## 2022-03-07 RX ORDER — AMLODIPINE BESYLATE 5 MG/1
TABLET ORAL
Qty: 90 TABLET | Refills: 0 | Status: SHIPPED | OUTPATIENT
Start: 2022-03-07

## 2022-03-10 DIAGNOSIS — I10 BENIGN ESSENTIAL HYPERTENSION: ICD-10-CM

## 2022-03-10 RX ORDER — LISINOPRIL AND HYDROCHLOROTHIAZIDE 12.5; 2 MG/1; MG/1
TABLET ORAL
Qty: 90 TABLET | Refills: 3 | OUTPATIENT
Start: 2022-03-10

## 2022-03-10 RX ORDER — AMLODIPINE BESYLATE 5 MG/1
TABLET ORAL
Qty: 90 TABLET | Refills: 3 | OUTPATIENT
Start: 2022-03-10

## 2022-03-12 ENCOUNTER — HEALTH MAINTENANCE LETTER (OUTPATIENT)
Age: 32
End: 2022-03-12

## 2022-05-07 ENCOUNTER — HEALTH MAINTENANCE LETTER (OUTPATIENT)
Age: 32
End: 2022-05-07

## 2022-05-23 ENCOUNTER — MYC MEDICAL ADVICE (OUTPATIENT)
Dept: FAMILY MEDICINE | Facility: CLINIC | Age: 32
End: 2022-05-23
Payer: COMMERCIAL

## 2022-12-26 ENCOUNTER — HEALTH MAINTENANCE LETTER (OUTPATIENT)
Age: 32
End: 2022-12-26

## 2023-04-22 ENCOUNTER — HEALTH MAINTENANCE LETTER (OUTPATIENT)
Age: 33
End: 2023-04-22

## 2023-06-02 ENCOUNTER — HEALTH MAINTENANCE LETTER (OUTPATIENT)
Age: 33
End: 2023-06-02

## 2024-06-23 ENCOUNTER — HEALTH MAINTENANCE LETTER (OUTPATIENT)
Age: 34
End: 2024-06-23

## 2024-10-17 ENCOUNTER — LAB REQUISITION (OUTPATIENT)
Dept: LAB | Facility: CLINIC | Age: 34
End: 2024-10-17

## 2024-10-17 PROCEDURE — 86481 TB AG RESPONSE T-CELL SUSP: CPT | Performed by: INTERNAL MEDICINE

## 2024-10-17 PROCEDURE — 36415 COLL VENOUS BLD VENIPUNCTURE: CPT | Performed by: INTERNAL MEDICINE

## 2024-10-18 LAB
GAMMA INTERFERON BACKGROUND BLD IA-ACNC: 0.02 IU/ML
M TB IFN-G BLD-IMP: NEGATIVE
M TB IFN-G CD4+ BCKGRND COR BLD-ACNC: 9.98 IU/ML
MITOGEN IGNF BCKGRD COR BLD-ACNC: 0.01 IU/ML
MITOGEN IGNF BCKGRD COR BLD-ACNC: 0.02 IU/ML
QUANTIFERON MITOGEN: 10 IU/ML
QUANTIFERON NIL TUBE: 0.02 IU/ML
QUANTIFERON TB1 TUBE: 0.03 IU/ML
QUANTIFERON TB2 TUBE: 0.04